# Patient Record
Sex: MALE | Race: WHITE | Employment: OTHER | URBAN - NONMETROPOLITAN AREA
[De-identification: names, ages, dates, MRNs, and addresses within clinical notes are randomized per-mention and may not be internally consistent; named-entity substitution may affect disease eponyms.]

---

## 2020-12-30 ENCOUNTER — HOSPITAL ENCOUNTER (EMERGENCY)
Age: 50
Discharge: HOME | End: 2020-12-30
Attending: EMERGENCY MEDICINE
Payer: MEDICAID

## 2020-12-30 VITALS
SYSTOLIC BLOOD PRESSURE: 156 MMHG | DIASTOLIC BLOOD PRESSURE: 94 MMHG | BODY MASS INDEX: 28.77 KG/M2 | RESPIRATION RATE: 18 BRPM | HEIGHT: 66 IN | OXYGEN SATURATION: 98 % | HEART RATE: 86 BPM | TEMPERATURE: 98.1 F | WEIGHT: 179 LBS

## 2020-12-30 PROCEDURE — 99282 EMERGENCY DEPT VISIT SF MDM: CPT

## 2020-12-30 RX ORDER — NAPROXEN 500 MG/1
500 TABLET ORAL 2 TIMES DAILY WITH MEALS
Qty: 30 TABLET | Refills: 1 | Status: ON HOLD | OUTPATIENT
Start: 2020-12-30 | End: 2021-11-11 | Stop reason: ALTCHOICE

## 2020-12-30 ASSESSMENT — PAIN DESCRIPTION - LOCATION: LOCATION: ELBOW

## 2020-12-30 ASSESSMENT — PAIN DESCRIPTION - ORIENTATION: ORIENTATION: RIGHT

## 2020-12-30 ASSESSMENT — PAIN SCALES - GENERAL: PAINLEVEL_OUTOF10: 10

## 2020-12-30 NOTE — ED PROVIDER NOTES
eMERGENCY dEPARTMENT eNCOUnter        [facility]    Chief Complaint   Patient presents with    Arm Pain     Pt has pain to right elbow for past couple days denies injury       HPI    José Luis Marrero is a 45 y.o. [facility] With complaint of R elbow pain. Onset x 1 week. Intensity of symptoms moderate, getting worse. Location of symptoms R elbow. Patient denies injury. Patient states that he has been shoveling a lot of snow in the past few days. REVIEW OF SYSTEMS    All systems reviewed and positives are in the HPI      PAST MEDICAL HISTORY    Past Medical History:   Diagnosis Date    Diabetes mellitus ([address]     Hypertension        SURGICAL HISTORY    Past Surgical History:   Procedure Laterality Date    HERNIA REPAIR         CURRENT MEDICATIONS    Current Outpatient Rx   Medication Sig Dispense Refill    METFORMIN HCL PO Take 1,000 mg by mouth 2 times daily      insulin aspart protamine-insulin aspart (NOVOLOG 70/30) (70-30) 100 UNIT/ML injection Inject 70 Units into the skin 2 times daily (with meals)      naproxen (NAPROSYN) 500 MG tablet Take 1 tablet by mouth 2 times daily (with meals) 30 tablet 1       ALLERGIES    No Known Allergies    FAMILY HISTORY    History reviewed. No pertinent family history.     SOCIAL HISTORY    Social History     Socioeconomic History    Marital status:      Spouse name: None    Number of children: None    Years of education: None    Highest education level: None   Occupational History    None   Social Needs    Financial resource strain: None    Food insecurity     Worry: None     Inability: None    Transportation needs     Medical: None     Non-medical: None   Tobacco Use    Smoking status: Never Smoker    Smokeless tobacco: Never Used   Substance and Sexual Activity    Alcohol use: None    Drug use: None    Sexual activity: None   Lifestyle    Physical activity     Days per week: None     Minutes per session: None    Stress: None   Relationships    Social connections     Talks on phone: None     Gets together: None     Attends Mandaeism service: None     Active member of club or organization: None     Attends meetings of clubs or organizations: None     Relationship status: None    Intimate partner violence     Fear of current or ex partner: None     Emotionally abused: None     Physically abused: None     Forced sexual activity: None   Other Topics Concern    None   Social History Narrative    None       PHYSICAL EXAM    VITAL SIGNS: BP (!) 156/94   Pulse 86   Temp 98.1 °F (36.7 °C) (Oral)   Resp 18   Ht 5' 6\" (1.676 m)   Wt 179 lb (81.2 kg)   SpO2 98%   BMI 28.89 kg/m²   Constitutional:  Well developed, well nourished, no acute distress, non-toxic appearance   HENT:  Atraumatic, external ears normal, nose normal, oropharynx moist.  Neck- normal range of motion, no tenderness, supple   Respiratory:  No respiratory distress, normal breath sounds. Cardiovascular:  Normal rate, normal rhythm, no murmurs, no gallops, no rubs   GI:  Soft, nondistended, normal bowel sounds, nontender   Musculoskeletal: Pain over the right lateral epicondyle with rotation to  resistance  Integument:  Well hydrated, no rash   Neurologic: Negative. RADIOLOGY/PROCEDURES    No orders to display         Labs  Labs Reviewed - No data to display          Summation      Patient Course: Ace wrap is applied to the right forearm. Physical activity neck sizes were discussed. Follow-up with Ortho if not better in a week.     ED Medications administered this visit:  Medications - No data to display    New Prescriptions from this visit:    New Prescriptions    NAPROXEN (NAPROSYN) 500 MG TABLET    Take 1 tablet by mouth 2 times daily (with meals)       Follow-up:  [address] [place] [address] [facility]  [phone]  Schedule an appointment as soon as possible for a visit in 1 week  As needed, If symptoms worsen        Final Impression:

## 2020-12-30 NOTE — ED TRIAGE NOTES
Pt medication list verified by patient, pt does take a blood pressure medication but he is unsure of the name.

## 2021-01-08 ENCOUNTER — HOSPITAL ENCOUNTER (OUTPATIENT)
Age: 51
Discharge: HOME | End: 2021-01-10
Payer: MEDICAID

## 2021-01-08 ENCOUNTER — HOSPITAL ENCOUNTER (OUTPATIENT)
Dept: GENERAL RADIOLOGY | Age: 51
Discharge: HOME | End: 2021-01-10
Payer: MEDICAID

## 2021-01-08 DIAGNOSIS — M25.521 RIGHT ELBOW PAIN: ICD-10-CM

## 2021-01-08 PROCEDURE — 73080 X-RAY EXAM OF ELBOW: CPT

## 2021-01-14 ENCOUNTER — HOSPITAL ENCOUNTER (EMERGENCY)
Age: 51
Discharge: HOME | End: 2021-01-14
Attending: FAMILY MEDICINE
Payer: MEDICAID

## 2021-01-14 VITALS
TEMPERATURE: 97.6 F | BODY MASS INDEX: 29.25 KG/M2 | RESPIRATION RATE: 18 BRPM | HEIGHT: 66 IN | DIASTOLIC BLOOD PRESSURE: 89 MMHG | SYSTOLIC BLOOD PRESSURE: 162 MMHG | HEART RATE: 91 BPM | OXYGEN SATURATION: 95 % | WEIGHT: 182 LBS

## 2021-01-14 DIAGNOSIS — Z86.39 HISTORY OF DIABETES MELLITUS: ICD-10-CM

## 2021-01-14 DIAGNOSIS — I10 ESSENTIAL HYPERTENSION: Primary | ICD-10-CM

## 2021-01-14 PROCEDURE — 99284 EMERGENCY DEPT VISIT MOD MDM: CPT

## 2021-01-14 PROCEDURE — 99283 EMERGENCY DEPT VISIT LOW MDM: CPT

## 2021-01-14 RX ORDER — INSULIN GLARGINE 100 [IU]/ML
50 INJECTION, SOLUTION SUBCUTANEOUS NIGHTLY
COMMUNITY

## 2021-01-14 RX ORDER — ATORVASTATIN CALCIUM 10 MG/1
10 TABLET, FILM COATED ORAL DAILY
COMMUNITY

## 2021-01-14 RX ORDER — LISINOPRIL 20 MG/1
20 TABLET ORAL DAILY
COMMUNITY
End: 2021-01-14

## 2021-01-14 RX ORDER — LISINOPRIL AND HYDROCHLOROTHIAZIDE 25; 20 MG/1; MG/1
1 TABLET ORAL DAILY
Qty: 30 TABLET | Refills: 1 | Status: ON HOLD | OUTPATIENT
Start: 2021-01-14 | End: 2021-11-11

## 2021-01-15 ASSESSMENT — ENCOUNTER SYMPTOMS
SHORTNESS OF BREATH: 0
PHOTOPHOBIA: 0
CHEST TIGHTNESS: 0
NAUSEA: 0

## 2021-01-15 NOTE — ED PROVIDER NOTES
[address]  eMERGENCY dEPARTMENT eNCOUnter          [facility]       Chief Complaint   Patient presents with    Hypertension     Pt ambulates in ER with c/o high blood pressure for past 2 weeks       Nurses Notes reviewed and I agree except as noted in the HPI. HISTORY OF PRESENT ILLNESS    Sia Pichardo is a 45 y.o. male who presents to the emergency room via private vehicle, patient states he has a history of blood pressure has been compliant with medications however his blood pressures been elevated, currently 176/100. Patient denies any chest pain or palpitations denies difficulty breathing shortness of breath denies any lightheaded dizziness, denies any photophobia, denies any tinnitus that is new, denies any nausea. Patient states he is currently separation with a new PCP but is not seeing them yet, so came to the emergency room for possible medication change. REVIEW OF SYSTEMS     Review of Systems   HENT: Negative for tinnitus. Eyes: Negative for photophobia and visual disturbance. Respiratory: Negative for chest tightness and shortness of breath. Cardiovascular: Negative for chest pain, palpitations and leg swelling. Gastrointestinal: Negative for nausea. Neurological: Negative for dizziness, light-headedness and headaches. All other systems reviewed and are negative. PAST MEDICAL HISTORY    has a past medical history of Diabetes mellitus ([address] and Hypertension. SURGICAL HISTORY      has a past surgical history that includes hernia repair.     CURRENT MEDICATIONS       Discharge Medication List as of 1/14/2021  9:37 AM      CONTINUE these medications which have NOT CHANGED    Details   atorvastatin (LIPITOR) 10 MG tablet Take 10 mg by mouth dailyHistorical Med      SITagliptin (JANUVIA) 100 MG tablet Take 100 mg by mouth dailyHistorical Med      insulin glargine (LANTUS) 100 UNIT/ML injection vial Inject 80 Units into the skin nightlyHistorical Med      naproxen (NAPROSYN) 500 MG tablet Take 1 tablet by mouth 2 times daily (with meals), Disp-30 tablet, R-1Print             ALLERGIES     has No Known Allergies. FAMILY HISTORY     has no family status information on file. family history is not on file. SOCIAL HISTORY      reports that he has never smoked. He has never used smokeless tobacco.    PHYSICAL EXAM     INITIAL VITALS:  height is 5' 6\" (1.676 m) and weight is 182 lb (82.6 kg). His oral temperature is 97.6 °F (36.4 °C). His blood pressure is 162/89 (abnormal) and his pulse is 91. His respiration is 18 and oxygen saturation is 95%. Physical Exam   Constitutional: Patient is oriented to person, place, and time. Patient appears well-developed and well-nourished. Patient is active and cooperative. HENT:   Head: Normocephalic and atraumatic. Head is without contusion. Right Ear: Hearing and external ear normal. No drainage. Left Ear: Hearing and external ear normal. No drainage. Nose: Nose normal. No nasal deformity. No epistaxis. Mouth/Throat: Mucous membranes are not dry. Eyes: EOMI. Conjunctivae, sclera, and lids are normal. Right eye exhibits no discharge. Left eye exhibits no discharge. Neck: Full passive range of motion without pain and phonation normal.   Cardiovascular:  Normal rate, regular rhythm and intact distal pulses. No edema. Pulses: Right radial pulse  2+   Pulmonary/Chest: Effort normal. No tachypnea and no bradypnea. No wheezes, rhonchi, or rales. Abdominal: Soft. Patient without distension or tenderness  Musculoskeletal:   Negative acute trauma or deformity,  apparent full range of motion and normal strength all extremities appropriate to age. Neurological: Patient is alert and oriented to person, place, and time. patient displays no tremor. Patient displays no seizure activity. . Normal observed gait  Skin: Skin is warm and dry. Patient is not diaphoretic.   Psychiatric: Patient has a tablet Take 1 tablet by mouth daily, Disp-30 tablet, R-1Normal                 Summation      Patient Course:  D/c    ED Medications administered this visit:  Medications - No data to display    New Prescriptions from this visit:    Discharge Medication List as of 1/14/2021  9:37 AM      START taking these medications    Details   lisinopril-hydroCHLOROthiazide (PRINZIDE;ZESTORETIC) 20-25 MG per tablet Take 1 tablet by mouth daily, Disp-30 tablet, R-1Normal             Follow-up:  Mervat Brown MD  [address] [place] [address]  [phone]    Call       [facility]  [address] [address]  [phone]    As needed, If symptoms worsen        Final Impression:   1. Essential hypertension    2.  History of diabetes mellitus               (Please note that portions of this note were completed with a voice recognition program.  Efforts were made to edit the dictations but occasionally words are mis-transcribed.)    MD Yesi Tse MD  01/15/21 6376

## 2021-11-10 ENCOUNTER — HOSPITAL ENCOUNTER (EMERGENCY)
Age: 51
Discharge: ANOTHER HOSPITAL | End: 2021-11-11
Attending: FAMILY MEDICINE
Payer: MEDICARE

## 2021-11-10 ENCOUNTER — APPOINTMENT (OUTPATIENT)
Dept: CT IMAGING | Age: 51
End: 2021-11-10
Payer: MEDICARE

## 2021-11-10 ENCOUNTER — APPOINTMENT (OUTPATIENT)
Dept: GENERAL RADIOLOGY | Age: 51
End: 2021-11-10
Payer: MEDICARE

## 2021-11-10 DIAGNOSIS — R11.2 NON-INTRACTABLE VOMITING WITH NAUSEA, UNSPECIFIED VOMITING TYPE: ICD-10-CM

## 2021-11-10 DIAGNOSIS — N17.9 ACUTE KIDNEY INJURY (NONTRAUMATIC) (HCC): ICD-10-CM

## 2021-11-10 DIAGNOSIS — K56.7 ILEUS (HCC): ICD-10-CM

## 2021-11-10 DIAGNOSIS — N20.0 KIDNEY STONE ON LEFT SIDE: Primary | ICD-10-CM

## 2021-11-10 LAB
ABO/RH: NORMAL
ABSOLUTE EOS #: 0.2 K/UL (ref 0–0.4)
ABSOLUTE IMMATURE GRANULOCYTE: NORMAL K/UL (ref 0–0.3)
ABSOLUTE LYMPH #: 2.1 K/UL (ref 1–4.8)
ABSOLUTE MONO #: 0.6 K/UL (ref 0–1)
ALBUMIN SERPL-MCNC: 4.2 G/DL (ref 3.5–5.2)
ALBUMIN/GLOBULIN RATIO: NORMAL (ref 1–2.5)
ALP BLD-CCNC: 53 U/L (ref 40–129)
ALT SERPL-CCNC: 35 U/L (ref 5–41)
ANION GAP SERPL CALCULATED.3IONS-SCNC: 21 MMOL/L (ref 9–17)
ANTIBODY SCREEN: NEGATIVE
ARM BAND NUMBER: NORMAL
AST SERPL-CCNC: 23 U/L
BASOPHILS # BLD: 1 % (ref 0–2)
BASOPHILS ABSOLUTE: 0.1 K/UL (ref 0–0.2)
BILIRUB SERPL-MCNC: 0.44 MG/DL (ref 0.3–1.2)
BILIRUBIN DIRECT: <0.08 MG/DL
BILIRUBIN, INDIRECT: NORMAL MG/DL (ref 0–1)
BNP INTERPRETATION: NORMAL
BUN BLDV-MCNC: 27 MG/DL (ref 6–20)
BUN/CREAT BLD: 17 (ref 9–20)
CALCIUM SERPL-MCNC: 10.8 MG/DL (ref 8.6–10.4)
CHLORIDE BLD-SCNC: 95 MMOL/L (ref 98–107)
CO2: 19 MMOL/L (ref 20–31)
CREAT SERPL-MCNC: 1.57 MG/DL (ref 0.7–1.2)
DIFFERENTIAL TYPE: YES
EOSINOPHILS RELATIVE PERCENT: 2 % (ref 0–5)
EXPIRATION DATE: NORMAL
GFR AFRICAN AMERICAN: 57 ML/MIN
GFR NON-AFRICAN AMERICAN: 47 ML/MIN
GFR SERPL CREATININE-BSD FRML MDRD: ABNORMAL ML/MIN/{1.73_M2}
GFR SERPL CREATININE-BSD FRML MDRD: ABNORMAL ML/MIN/{1.73_M2}
GLOBULIN: NORMAL G/DL (ref 1.5–3.8)
GLUCOSE BLD-MCNC: 133 MG/DL (ref 65–99)
GLUCOSE BLD-MCNC: 137 MG/DL (ref 70–99)
HCT VFR BLD CALC: 47.8 % (ref 41–53)
HEMOGLOBIN: 16.3 G/DL (ref 13.5–17.5)
IMMATURE GRANULOCYTES: NORMAL %
INR BLD: 1
LACTIC ACID: 2.8 MMOL/L (ref 0.5–2.2)
LIPASE: 69 U/L (ref 13–60)
LYMPHOCYTES # BLD: 23 % (ref 13–44)
MAGNESIUM: 2.5 MG/DL (ref 1.6–2.6)
MCH RBC QN AUTO: 30 PG (ref 26–34)
MCHC RBC AUTO-ENTMCNC: 34.2 G/DL (ref 31–37)
MCV RBC AUTO: 87.8 FL (ref 80–100)
MONOCYTES # BLD: 6 % (ref 5–9)
NRBC AUTOMATED: NORMAL PER 100 WBC
PDW BLD-RTO: 13.4 % (ref 12.1–15.2)
PLATELET # BLD: 274 K/UL (ref 140–450)
PLATELET ESTIMATE: NORMAL
PMV BLD AUTO: NORMAL FL (ref 6–12)
POTASSIUM SERPL-SCNC: 3.3 MMOL/L (ref 3.7–5.3)
PRO-BNP: 73 PG/ML
PROTHROMBIN TIME: 13.2 SEC (ref 11.5–14.2)
RBC # BLD: 5.45 M/UL (ref 4.5–5.9)
RBC # BLD: NORMAL 10*6/UL
SARS-COV-2, RAPID: NOT DETECTED
SEG NEUTROPHILS: 68 % (ref 39–75)
SEGMENTED NEUTROPHILS ABSOLUTE COUNT: 6.5 K/UL (ref 2.1–6.5)
SODIUM BLD-SCNC: 135 MMOL/L (ref 135–144)
SPECIMEN DESCRIPTION: NORMAL
TOTAL PROTEIN: 7.3 G/DL (ref 6.4–8.3)
TROPONIN INTERP: NORMAL
TROPONIN INTERP: NORMAL
TROPONIN T: NORMAL NG/ML
TROPONIN T: NORMAL NG/ML
TROPONIN, HIGH SENSITIVITY: 16 NG/L (ref 0–22)
TROPONIN, HIGH SENSITIVITY: 20 NG/L (ref 0–22)
WBC # BLD: 9.4 K/UL (ref 3.5–11)
WBC # BLD: NORMAL 10*3/UL

## 2021-11-10 PROCEDURE — 96375 TX/PRO/DX INJ NEW DRUG ADDON: CPT

## 2021-11-10 PROCEDURE — 96376 TX/PRO/DX INJ SAME DRUG ADON: CPT

## 2021-11-10 PROCEDURE — 83735 ASSAY OF MAGNESIUM: CPT

## 2021-11-10 PROCEDURE — 86900 BLOOD TYPING SEROLOGIC ABO: CPT

## 2021-11-10 PROCEDURE — 85025 COMPLETE CBC W/AUTO DIFF WBC: CPT

## 2021-11-10 PROCEDURE — 86901 BLOOD TYPING SEROLOGIC RH(D): CPT

## 2021-11-10 PROCEDURE — 85610 PROTHROMBIN TIME: CPT

## 2021-11-10 PROCEDURE — 83880 ASSAY OF NATRIURETIC PEPTIDE: CPT

## 2021-11-10 PROCEDURE — 96374 THER/PROPH/DIAG INJ IV PUSH: CPT

## 2021-11-10 PROCEDURE — 80076 HEPATIC FUNCTION PANEL: CPT

## 2021-11-10 PROCEDURE — 80048 BASIC METABOLIC PNL TOTAL CA: CPT

## 2021-11-10 PROCEDURE — 71275 CT ANGIOGRAPHY CHEST: CPT

## 2021-11-10 PROCEDURE — 36415 COLL VENOUS BLD VENIPUNCTURE: CPT

## 2021-11-10 PROCEDURE — 6360000004 HC RX CONTRAST MEDICATION: Performed by: FAMILY MEDICINE

## 2021-11-10 PROCEDURE — 86850 RBC ANTIBODY SCREEN: CPT

## 2021-11-10 PROCEDURE — 84484 ASSAY OF TROPONIN QUANT: CPT

## 2021-11-10 PROCEDURE — 2580000003 HC RX 258: Performed by: FAMILY MEDICINE

## 2021-11-10 PROCEDURE — 99285 EMERGENCY DEPT VISIT HI MDM: CPT

## 2021-11-10 PROCEDURE — 82947 ASSAY GLUCOSE BLOOD QUANT: CPT

## 2021-11-10 PROCEDURE — 6360000002 HC RX W HCPCS: Performed by: FAMILY MEDICINE

## 2021-11-10 PROCEDURE — 81001 URINALYSIS AUTO W/SCOPE: CPT

## 2021-11-10 PROCEDURE — 87635 SARS-COV-2 COVID-19 AMP PRB: CPT

## 2021-11-10 PROCEDURE — 71045 X-RAY EXAM CHEST 1 VIEW: CPT

## 2021-11-10 PROCEDURE — 93005 ELECTROCARDIOGRAM TRACING: CPT | Performed by: FAMILY MEDICINE

## 2021-11-10 PROCEDURE — 83605 ASSAY OF LACTIC ACID: CPT

## 2021-11-10 PROCEDURE — 83690 ASSAY OF LIPASE: CPT

## 2021-11-10 RX ORDER — METHYLPREDNISOLONE SODIUM SUCCINATE 125 MG/2ML
80 INJECTION, POWDER, LYOPHILIZED, FOR SOLUTION INTRAMUSCULAR; INTRAVENOUS ONCE
Status: DISCONTINUED | OUTPATIENT
Start: 2021-11-10 | End: 2021-11-10

## 2021-11-10 RX ORDER — KETOROLAC TROMETHAMINE 30 MG/ML
15 INJECTION, SOLUTION INTRAMUSCULAR; INTRAVENOUS ONCE
Status: DISCONTINUED | OUTPATIENT
Start: 2021-11-10 | End: 2021-11-10

## 2021-11-10 RX ORDER — MELOXICAM 7.5 MG/1
15 TABLET ORAL DAILY
Status: ON HOLD | COMMUNITY
End: 2021-11-12

## 2021-11-10 RX ORDER — FENTANYL CITRATE 50 UG/ML
50 INJECTION, SOLUTION INTRAMUSCULAR; INTRAVENOUS ONCE
Status: COMPLETED | OUTPATIENT
Start: 2021-11-10 | End: 2021-11-10

## 2021-11-10 RX ORDER — FENTANYL CITRATE 50 UG/ML
25 INJECTION, SOLUTION INTRAMUSCULAR; INTRAVENOUS ONCE
Status: COMPLETED | OUTPATIENT
Start: 2021-11-10 | End: 2021-11-10

## 2021-11-10 RX ORDER — SODIUM CHLORIDE 9 MG/ML
1000 INJECTION, SOLUTION INTRAVENOUS CONTINUOUS
Status: DISCONTINUED | OUTPATIENT
Start: 2021-11-10 | End: 2021-11-11 | Stop reason: HOSPADM

## 2021-11-10 RX ORDER — PROMETHAZINE HYDROCHLORIDE 25 MG/ML
12.5 INJECTION, SOLUTION INTRAMUSCULAR; INTRAVENOUS ONCE
Status: COMPLETED | OUTPATIENT
Start: 2021-11-10 | End: 2021-11-10

## 2021-11-10 RX ORDER — 0.9 % SODIUM CHLORIDE 0.9 %
1000 INTRAVENOUS SOLUTION INTRAVENOUS ONCE
Status: COMPLETED | OUTPATIENT
Start: 2021-11-10 | End: 2021-11-10

## 2021-11-10 RX ORDER — ONDANSETRON 2 MG/ML
4 INJECTION INTRAMUSCULAR; INTRAVENOUS ONCE
Status: COMPLETED | OUTPATIENT
Start: 2021-11-10 | End: 2021-11-10

## 2021-11-10 RX ORDER — DIPHENHYDRAMINE HYDROCHLORIDE 50 MG/ML
25 INJECTION INTRAMUSCULAR; INTRAVENOUS ONCE
Status: DISCONTINUED | OUTPATIENT
Start: 2021-11-10 | End: 2021-11-10

## 2021-11-10 RX ADMIN — SODIUM CHLORIDE 1000 ML: 9 INJECTION, SOLUTION INTRAVENOUS at 19:42

## 2021-11-10 RX ADMIN — SODIUM CHLORIDE 1000 ML: 9 INJECTION, SOLUTION INTRAVENOUS at 17:52

## 2021-11-10 RX ADMIN — FENTANYL CITRATE 50 MCG: 50 INJECTION, SOLUTION INTRAMUSCULAR; INTRAVENOUS at 19:42

## 2021-11-10 RX ADMIN — IOPAMIDOL 100 ML: 755 INJECTION, SOLUTION INTRAVENOUS at 17:42

## 2021-11-10 RX ADMIN — FENTANYL CITRATE 25 MCG: 50 INJECTION, SOLUTION INTRAMUSCULAR; INTRAVENOUS at 19:01

## 2021-11-10 RX ADMIN — FENTANYL CITRATE 25 MCG: 50 INJECTION, SOLUTION INTRAMUSCULAR; INTRAVENOUS at 18:28

## 2021-11-10 RX ADMIN — ONDANSETRON 4 MG: 2 INJECTION INTRAMUSCULAR; INTRAVENOUS at 17:30

## 2021-11-10 RX ADMIN — PROMETHAZINE HYDROCHLORIDE 12.5 MG: 25 INJECTION INTRAMUSCULAR; INTRAVENOUS at 19:00

## 2021-11-10 ASSESSMENT — PAIN DESCRIPTION - PAIN TYPE: TYPE: ACUTE PAIN

## 2021-11-10 ASSESSMENT — PAIN SCALES - GENERAL
PAINLEVEL_OUTOF10: 9
PAINLEVEL_OUTOF10: 10
PAINLEVEL_OUTOF10: 10
PAINLEVEL_OUTOF10: 9

## 2021-11-10 ASSESSMENT — PAIN DESCRIPTION - FREQUENCY: FREQUENCY: CONTINUOUS

## 2021-11-10 ASSESSMENT — PAIN DESCRIPTION - DESCRIPTORS: DESCRIPTORS: SHARP

## 2021-11-10 ASSESSMENT — PAIN DESCRIPTION - LOCATION: LOCATION: ABDOMEN

## 2021-11-10 NOTE — ED TRIAGE NOTES
SEVERE ABDOMINAL PAIN STARTED 3 DAYS AGO IN THE LOW ABD AND TRAVELS TO EPIGASTRIC AREA. SOB, VOMITING AS WEL. Thuan Payan

## 2021-11-11 ENCOUNTER — DIRECT ADMIT ORDERS (OUTPATIENT)
Dept: INTERNAL MEDICINE CLINIC | Age: 51
End: 2021-11-11

## 2021-11-11 ENCOUNTER — HOSPITAL ENCOUNTER (INPATIENT)
Age: 51
LOS: 2 days | Discharge: HOME | DRG: 389 | End: 2021-11-13
Attending: INTERNAL MEDICINE | Admitting: INTERNAL MEDICINE
Payer: MEDICARE

## 2021-11-11 VITALS
DIASTOLIC BLOOD PRESSURE: 88 MMHG | RESPIRATION RATE: 14 BRPM | HEART RATE: 103 BPM | OXYGEN SATURATION: 93 % | SYSTOLIC BLOOD PRESSURE: 130 MMHG | WEIGHT: 188 LBS | BODY MASS INDEX: 30.34 KG/M2

## 2021-11-11 PROBLEM — I10 ESSENTIAL HYPERTENSION: Status: ACTIVE | Noted: 2021-11-11

## 2021-11-11 PROBLEM — Z87.19 HISTORY OF HERNIA SURGERY: Status: ACTIVE | Noted: 2021-11-11

## 2021-11-11 PROBLEM — N17.9 AKI (ACUTE KIDNEY INJURY) (HCC): Status: ACTIVE | Noted: 2021-11-11

## 2021-11-11 PROBLEM — K56.600 PARTIAL SMALL BOWEL OBSTRUCTION (HCC): Status: ACTIVE | Noted: 2021-11-11

## 2021-11-11 PROBLEM — E11.69 DIABETES MELLITUS TYPE 2 IN OBESE (HCC): Status: ACTIVE | Noted: 2021-11-11

## 2021-11-11 PROBLEM — R10.10 PAIN OF UPPER ABDOMEN: Status: ACTIVE | Noted: 2021-11-11

## 2021-11-11 PROBLEM — N20.1 LEFT URETERAL STONE: Status: ACTIVE | Noted: 2021-11-11

## 2021-11-11 PROBLEM — E66.9 DIABETES MELLITUS TYPE 2 IN OBESE (HCC): Status: ACTIVE | Noted: 2021-11-11

## 2021-11-11 PROBLEM — Z98.890 HISTORY OF HERNIA SURGERY: Status: ACTIVE | Noted: 2021-11-11

## 2021-11-11 PROBLEM — N13.2 HYDRONEPHROSIS WITH RENAL CALCULOUS OBSTRUCTION: Status: ACTIVE | Noted: 2021-11-11

## 2021-11-11 LAB
-: NORMAL
AMORPHOUS: NORMAL
BACTERIA: NORMAL
BILIRUBIN URINE: ABNORMAL
CASTS UA: NORMAL /LPF
COLOR: YELLOW
COMMENT UA: ABNORMAL
CRYSTALS, UA: NORMAL /HPF
EKG ATRIAL RATE: 96 BPM
EKG P AXIS: 57 DEGREES
EKG P-R INTERVAL: 136 MS
EKG Q-T INTERVAL: 376 MS
EKG QRS DURATION: 92 MS
EKG QTC CALCULATION (BAZETT): 475 MS
EKG R AXIS: 17 DEGREES
EKG T AXIS: 56 DEGREES
EKG VENTRICULAR RATE: 96 BPM
EPITHELIAL CELLS UA: NORMAL /HPF
ESTIMATED AVERAGE GLUCOSE: 197 MG/DL
GLUCOSE BLD-MCNC: 143 MG/DL (ref 75–110)
GLUCOSE BLD-MCNC: 166 MG/DL (ref 75–110)
GLUCOSE URINE: ABNORMAL
HBA1C MFR BLD: 8.5 % (ref 4–6)
KETONES, URINE: ABNORMAL
LACTIC ACID, SEPSIS WHOLE BLOOD: 1 MMOL/L (ref 0.5–1.9)
LACTIC ACID, SEPSIS WHOLE BLOOD: 1.1 MMOL/L (ref 0.5–1.9)
LACTIC ACID, SEPSIS: NORMAL MMOL/L (ref 0.5–1.9)
LACTIC ACID, SEPSIS: NORMAL MMOL/L (ref 0.5–1.9)
LEUKOCYTE ESTERASE, URINE: NEGATIVE
MUCUS: NORMAL
NITRITE, URINE: NEGATIVE
OTHER OBSERVATIONS UA: NORMAL
PH UA: 5 (ref 5–8)
PROTEIN UA: ABNORMAL
RBC UA: NORMAL /HPF (ref 0–2)
RENAL EPITHELIAL, UA: NORMAL /HPF
SPECIFIC GRAVITY UA: 1.01 (ref 1–1.03)
TRICHOMONAS: NORMAL
TURBIDITY: CLEAR
URINE HGB: NEGATIVE
UROBILINOGEN, URINE: NORMAL
WBC UA: NORMAL /HPF
YEAST: NORMAL

## 2021-11-11 PROCEDURE — 2580000003 HC RX 258: Performed by: EMERGENCY MEDICINE

## 2021-11-11 PROCEDURE — 6360000002 HC RX W HCPCS: Performed by: FAMILY MEDICINE

## 2021-11-11 PROCEDURE — 51798 US URINE CAPACITY MEASURE: CPT

## 2021-11-11 PROCEDURE — 83605 ASSAY OF LACTIC ACID: CPT

## 2021-11-11 PROCEDURE — 83036 HEMOGLOBIN GLYCOSYLATED A1C: CPT

## 2021-11-11 PROCEDURE — 6360000002 HC RX W HCPCS: Performed by: INTERNAL MEDICINE

## 2021-11-11 PROCEDURE — 2580000003 HC RX 258: Performed by: FAMILY MEDICINE

## 2021-11-11 PROCEDURE — 2580000003 HC RX 258: Performed by: NURSE PRACTITIONER

## 2021-11-11 PROCEDURE — 1200000000 HC SEMI PRIVATE

## 2021-11-11 PROCEDURE — C9113 INJ PANTOPRAZOLE SODIUM, VIA: HCPCS | Performed by: EMERGENCY MEDICINE

## 2021-11-11 PROCEDURE — 99223 1ST HOSP IP/OBS HIGH 75: CPT | Performed by: INTERNAL MEDICINE

## 2021-11-11 PROCEDURE — 82947 ASSAY GLUCOSE BLOOD QUANT: CPT

## 2021-11-11 PROCEDURE — 6370000000 HC RX 637 (ALT 250 FOR IP): Performed by: NURSE PRACTITIONER

## 2021-11-11 PROCEDURE — 6360000002 HC RX W HCPCS: Performed by: NURSE PRACTITIONER

## 2021-11-11 PROCEDURE — 93010 ELECTROCARDIOGRAM REPORT: CPT | Performed by: INTERNAL MEDICINE

## 2021-11-11 PROCEDURE — 6360000002 HC RX W HCPCS: Performed by: EMERGENCY MEDICINE

## 2021-11-11 PROCEDURE — 36415 COLL VENOUS BLD VENIPUNCTURE: CPT

## 2021-11-11 PROCEDURE — 6370000000 HC RX 637 (ALT 250 FOR IP): Performed by: INTERNAL MEDICINE

## 2021-11-11 RX ORDER — ONDANSETRON 4 MG/1
4 TABLET, ORALLY DISINTEGRATING ORAL EVERY 8 HOURS PRN
Status: CANCELLED | OUTPATIENT
Start: 2021-11-11

## 2021-11-11 RX ORDER — SODIUM CHLORIDE 9 MG/ML
25 INJECTION, SOLUTION INTRAVENOUS PRN
Status: DISCONTINUED | OUTPATIENT
Start: 2021-11-11 | End: 2021-11-13 | Stop reason: HOSPADM

## 2021-11-11 RX ORDER — PANTOPRAZOLE SODIUM 40 MG/10ML
40 INJECTION, POWDER, LYOPHILIZED, FOR SOLUTION INTRAVENOUS ONCE
Status: COMPLETED | OUTPATIENT
Start: 2021-11-11 | End: 2021-11-11

## 2021-11-11 RX ORDER — DEXTROSE MONOHYDRATE 50 MG/ML
100 INJECTION, SOLUTION INTRAVENOUS PRN
Status: CANCELLED | OUTPATIENT
Start: 2021-11-11

## 2021-11-11 RX ORDER — HYDRALAZINE HYDROCHLORIDE 20 MG/ML
10 INJECTION INTRAMUSCULAR; INTRAVENOUS EVERY 4 HOURS PRN
Status: DISCONTINUED | OUTPATIENT
Start: 2021-11-11 | End: 2021-11-13 | Stop reason: HOSPADM

## 2021-11-11 RX ORDER — MORPHINE SULFATE 2 MG/ML
2 INJECTION, SOLUTION INTRAMUSCULAR; INTRAVENOUS
Status: CANCELLED | OUTPATIENT
Start: 2021-11-11

## 2021-11-11 RX ORDER — ONDANSETRON 2 MG/ML
4 INJECTION INTRAMUSCULAR; INTRAVENOUS EVERY 6 HOURS PRN
Status: CANCELLED | OUTPATIENT
Start: 2021-11-11

## 2021-11-11 RX ORDER — HYDROCODONE BITARTRATE AND ACETAMINOPHEN 5; 325 MG/1; MG/1
2 TABLET ORAL EVERY 4 HOURS PRN
Status: CANCELLED | OUTPATIENT
Start: 2021-11-11

## 2021-11-11 RX ORDER — SODIUM CHLORIDE 9 MG/ML
INJECTION, SOLUTION INTRAVENOUS CONTINUOUS
Status: DISCONTINUED | OUTPATIENT
Start: 2021-11-11 | End: 2021-11-13

## 2021-11-11 RX ORDER — SODIUM CHLORIDE 9 MG/ML
25 INJECTION, SOLUTION INTRAVENOUS PRN
Status: CANCELLED | OUTPATIENT
Start: 2021-11-11

## 2021-11-11 RX ORDER — TAMSULOSIN HYDROCHLORIDE 0.4 MG/1
0.4 CAPSULE ORAL DAILY
Status: DISCONTINUED | OUTPATIENT
Start: 2021-11-11 | End: 2021-11-13 | Stop reason: HOSPADM

## 2021-11-11 RX ORDER — NICOTINE POLACRILEX 4 MG
15 LOZENGE BUCCAL PRN
Status: CANCELLED | OUTPATIENT
Start: 2021-11-11

## 2021-11-11 RX ORDER — DEXTROSE MONOHYDRATE 25 G/50ML
12.5 INJECTION, SOLUTION INTRAVENOUS PRN
Status: CANCELLED | OUTPATIENT
Start: 2021-11-11

## 2021-11-11 RX ORDER — MORPHINE SULFATE 4 MG/ML
4 INJECTION, SOLUTION INTRAMUSCULAR; INTRAVENOUS
Status: CANCELLED | OUTPATIENT
Start: 2021-11-11

## 2021-11-11 RX ORDER — NICOTINE POLACRILEX 4 MG
15 LOZENGE BUCCAL PRN
Status: DISCONTINUED | OUTPATIENT
Start: 2021-11-11 | End: 2021-11-13 | Stop reason: HOSPADM

## 2021-11-11 RX ORDER — POLYETHYLENE GLYCOL 3350 17 G/17G
17 POWDER, FOR SOLUTION ORAL DAILY PRN
Status: DISCONTINUED | OUTPATIENT
Start: 2021-11-11 | End: 2021-11-13 | Stop reason: HOSPADM

## 2021-11-11 RX ORDER — HYDROCODONE BITARTRATE AND ACETAMINOPHEN 5; 325 MG/1; MG/1
1 TABLET ORAL EVERY 4 HOURS PRN
Status: CANCELLED | OUTPATIENT
Start: 2021-11-11

## 2021-11-11 RX ORDER — ACETAMINOPHEN 325 MG/1
650 TABLET ORAL EVERY 4 HOURS PRN
Status: DISCONTINUED | OUTPATIENT
Start: 2021-11-11 | End: 2021-11-13 | Stop reason: HOSPADM

## 2021-11-11 RX ORDER — SODIUM CHLORIDE 0.9 % (FLUSH) 0.9 %
5-40 SYRINGE (ML) INJECTION EVERY 12 HOURS SCHEDULED
Status: CANCELLED | OUTPATIENT
Start: 2021-11-11

## 2021-11-11 RX ORDER — DEXTROSE MONOHYDRATE 25 G/50ML
12.5 INJECTION, SOLUTION INTRAVENOUS PRN
Status: DISCONTINUED | OUTPATIENT
Start: 2021-11-11 | End: 2021-11-13 | Stop reason: HOSPADM

## 2021-11-11 RX ORDER — HYDROCODONE BITARTRATE AND ACETAMINOPHEN 5; 325 MG/1; MG/1
1 TABLET ORAL EVERY 4 HOURS PRN
Status: DISCONTINUED | OUTPATIENT
Start: 2021-11-11 | End: 2021-11-13 | Stop reason: HOSPADM

## 2021-11-11 RX ORDER — MORPHINE SULFATE 2 MG/ML
0.5 INJECTION, SOLUTION INTRAMUSCULAR; INTRAVENOUS EVERY 4 HOURS PRN
Status: DISCONTINUED | OUTPATIENT
Start: 2021-11-11 | End: 2021-11-13 | Stop reason: HOSPADM

## 2021-11-11 RX ORDER — CIPROFLOXACIN 2 MG/ML
400 INJECTION, SOLUTION INTRAVENOUS EVERY 12 HOURS
Status: DISCONTINUED | OUTPATIENT
Start: 2021-11-11 | End: 2021-11-13

## 2021-11-11 RX ORDER — LOSARTAN POTASSIUM 100 MG/1
100 TABLET ORAL DAILY
Status: ON HOLD | COMMUNITY
End: 2021-11-12

## 2021-11-11 RX ORDER — SODIUM CHLORIDE 0.9 % (FLUSH) 0.9 %
5-40 SYRINGE (ML) INJECTION PRN
Status: CANCELLED | OUTPATIENT
Start: 2021-11-11

## 2021-11-11 RX ORDER — POLYETHYLENE GLYCOL 3350 17 G/17G
17 POWDER, FOR SOLUTION ORAL DAILY PRN
Status: CANCELLED | OUTPATIENT
Start: 2021-11-11

## 2021-11-11 RX ORDER — ONDANSETRON 4 MG/1
4 TABLET, ORALLY DISINTEGRATING ORAL EVERY 8 HOURS PRN
Status: DISCONTINUED | OUTPATIENT
Start: 2021-11-11 | End: 2021-11-13 | Stop reason: HOSPADM

## 2021-11-11 RX ORDER — HYDROCODONE BITARTRATE AND ACETAMINOPHEN 5; 325 MG/1; MG/1
2 TABLET ORAL EVERY 4 HOURS PRN
Status: DISCONTINUED | OUTPATIENT
Start: 2021-11-11 | End: 2021-11-13 | Stop reason: HOSPADM

## 2021-11-11 RX ORDER — HEPARIN SODIUM 5000 [USP'U]/ML
5000 INJECTION, SOLUTION INTRAVENOUS; SUBCUTANEOUS EVERY 8 HOURS SCHEDULED
Status: DISCONTINUED | OUTPATIENT
Start: 2021-11-11 | End: 2021-11-13 | Stop reason: HOSPADM

## 2021-11-11 RX ORDER — TAMSULOSIN HYDROCHLORIDE 0.4 MG/1
0.4 CAPSULE ORAL DAILY
Status: CANCELLED | OUTPATIENT
Start: 2021-11-11

## 2021-11-11 RX ORDER — BISACODYL 10 MG
10 SUPPOSITORY, RECTAL RECTAL DAILY PRN
Status: DISCONTINUED | OUTPATIENT
Start: 2021-11-11 | End: 2021-11-13 | Stop reason: HOSPADM

## 2021-11-11 RX ORDER — DEXTROSE MONOHYDRATE 50 MG/ML
100 INJECTION, SOLUTION INTRAVENOUS PRN
Status: DISCONTINUED | OUTPATIENT
Start: 2021-11-11 | End: 2021-11-13 | Stop reason: HOSPADM

## 2021-11-11 RX ORDER — SODIUM CHLORIDE 0.9 % (FLUSH) 0.9 %
5-40 SYRINGE (ML) INJECTION EVERY 12 HOURS SCHEDULED
Status: DISCONTINUED | OUTPATIENT
Start: 2021-11-11 | End: 2021-11-13 | Stop reason: HOSPADM

## 2021-11-11 RX ORDER — SODIUM CHLORIDE 9 MG/ML
INJECTION, SOLUTION INTRAVENOUS CONTINUOUS
Status: CANCELLED | OUTPATIENT
Start: 2021-11-11

## 2021-11-11 RX ORDER — MORPHINE SULFATE 2 MG/ML
2 INJECTION, SOLUTION INTRAMUSCULAR; INTRAVENOUS
Status: DISCONTINUED | OUTPATIENT
Start: 2021-11-11 | End: 2021-11-11

## 2021-11-11 RX ORDER — MORPHINE SULFATE 4 MG/ML
4 INJECTION, SOLUTION INTRAMUSCULAR; INTRAVENOUS
Status: DISCONTINUED | OUTPATIENT
Start: 2021-11-11 | End: 2021-11-11

## 2021-11-11 RX ORDER — CIPROFLOXACIN 2 MG/ML
400 INJECTION, SOLUTION INTRAVENOUS EVERY 12 HOURS
Status: CANCELLED | OUTPATIENT
Start: 2021-11-11

## 2021-11-11 RX ORDER — MORPHINE SULFATE 2 MG/ML
1 INJECTION, SOLUTION INTRAMUSCULAR; INTRAVENOUS EVERY 4 HOURS PRN
Status: DISCONTINUED | OUTPATIENT
Start: 2021-11-11 | End: 2021-11-13 | Stop reason: HOSPADM

## 2021-11-11 RX ORDER — SODIUM CHLORIDE 0.9 % (FLUSH) 0.9 %
5-40 SYRINGE (ML) INJECTION PRN
Status: DISCONTINUED | OUTPATIENT
Start: 2021-11-11 | End: 2021-11-13 | Stop reason: HOSPADM

## 2021-11-11 RX ORDER — FENTANYL CITRATE 50 UG/ML
25 INJECTION, SOLUTION INTRAMUSCULAR; INTRAVENOUS ONCE
Status: COMPLETED | OUTPATIENT
Start: 2021-11-11 | End: 2021-11-11

## 2021-11-11 RX ORDER — ONDANSETRON 2 MG/ML
4 INJECTION INTRAMUSCULAR; INTRAVENOUS EVERY 6 HOURS PRN
Status: DISCONTINUED | OUTPATIENT
Start: 2021-11-11 | End: 2021-11-13 | Stop reason: HOSPADM

## 2021-11-11 RX ORDER — SODIUM CHLORIDE 9 MG/ML
10 INJECTION INTRAVENOUS ONCE
Status: COMPLETED | OUTPATIENT
Start: 2021-11-11 | End: 2021-11-11

## 2021-11-11 RX ORDER — GLUCAGON 1 MG/ML
1 KIT INJECTION PRN
Status: CANCELLED | OUTPATIENT
Start: 2021-11-11

## 2021-11-11 RX ORDER — ACETAMINOPHEN 325 MG/1
650 TABLET ORAL EVERY 4 HOURS PRN
Status: CANCELLED | OUTPATIENT
Start: 2021-11-11

## 2021-11-11 RX ADMIN — SODIUM CHLORIDE, PRESERVATIVE FREE 10 ML: 5 INJECTION INTRAVENOUS at 12:23

## 2021-11-11 RX ADMIN — MORPHINE SULFATE 1 MG: 2 INJECTION, SOLUTION INTRAMUSCULAR; INTRAVENOUS at 20:16

## 2021-11-11 RX ADMIN — TAMSULOSIN HYDROCHLORIDE 0.4 MG: 0.4 CAPSULE ORAL at 18:53

## 2021-11-11 RX ADMIN — MORPHINE SULFATE 4 MG: 4 INJECTION, SOLUTION INTRAMUSCULAR; INTRAVENOUS at 16:32

## 2021-11-11 RX ADMIN — PANTOPRAZOLE SODIUM 40 MG: 40 INJECTION, POWDER, FOR SOLUTION INTRAVENOUS at 12:23

## 2021-11-11 RX ADMIN — INSULIN LISPRO 1 UNITS: 100 INJECTION, SOLUTION INTRAVENOUS; SUBCUTANEOUS at 21:58

## 2021-11-11 RX ADMIN — CIPROFLOXACIN 400 MG: 2 INJECTION, SOLUTION INTRAVENOUS at 18:53

## 2021-11-11 RX ADMIN — SODIUM CHLORIDE: 9 INJECTION, SOLUTION INTRAVENOUS at 18:53

## 2021-11-11 RX ADMIN — SODIUM CHLORIDE 1000 ML: 9 INJECTION, SOLUTION INTRAVENOUS at 11:40

## 2021-11-11 RX ADMIN — FENTANYL CITRATE 25 MCG: 50 INJECTION, SOLUTION INTRAMUSCULAR; INTRAVENOUS at 07:23

## 2021-11-11 ASSESSMENT — PAIN DESCRIPTION - FREQUENCY
FREQUENCY: CONTINUOUS
FREQUENCY: CONTINUOUS

## 2021-11-11 ASSESSMENT — PAIN DESCRIPTION - ORIENTATION: ORIENTATION: MID

## 2021-11-11 ASSESSMENT — PAIN DESCRIPTION - DESCRIPTORS
DESCRIPTORS: ACHING
DESCRIPTORS: ACHING

## 2021-11-11 ASSESSMENT — PAIN SCALES - GENERAL
PAINLEVEL_OUTOF10: 7
PAINLEVEL_OUTOF10: 10
PAINLEVEL_OUTOF10: 8
PAINLEVEL_OUTOF10: 10
PAINLEVEL_OUTOF10: 10
PAINLEVEL_OUTOF10: 8

## 2021-11-11 ASSESSMENT — PAIN DESCRIPTION - PAIN TYPE
TYPE: ACUTE PAIN
TYPE: ACUTE PAIN

## 2021-11-11 ASSESSMENT — ENCOUNTER SYMPTOMS: ABDOMINAL PAIN: 1

## 2021-11-11 ASSESSMENT — PAIN DESCRIPTION - ONSET
ONSET: ON-GOING
ONSET: ON-GOING

## 2021-11-11 ASSESSMENT — PAIN DESCRIPTION - LOCATION
LOCATION: CHEST
LOCATION: CHEST

## 2021-11-11 NOTE — CARE COORDINATION
Case Management Initial Discharge Plan  Mariella Wolff,         Met with:patient to discuss discharge plans. Information verified: address, contacts, phone number, , insurance Yes  Insurance Provider: [facility]    Emergency Contact/Next of Kin name & number: pt's wife Harshad García [phone]  Who are involved in patient's support system? Pt's wife    PCP: Latisha Rojas MD  Date of last visit: 2 weeks ago      Discharge Planning    Living Arrangements:  Spouse/Significant Other     Home has 2 stories  Pt uses ramp to get into front door, one flight of stairs to climb to reach second floor  Location of bedroom/bathroom in home is on the main level    Patient able to perform ADL's:Independent    Current Services (outpatient & in home) none  DME equipment: cane, nebulizer  DME provider: n/a    Is patient receiving oral anticoagulation therapy? Yes, unsure which one    If indicated:   Physician managing anticoagulation treatment: n/a  Where does patient obtain lab work for ATC treatment? n/a      Potential Assistance Needed:  N/A    Patient agreeable to home care: No  [place] of choice provided:  n/a    Prior SNF/Rehab Placement and Facility: none  Agreeable to SNF/Rehab: No  [place] of choice provided: n/a     Evaluation: n/a    Expected Discharge date:  21    Patient expects to be discharged to: If home: is the family and/or caregiver wiling & able to provide support at home? yes  Who will be providing this support? wife    Follow Up Appointment: Best Day/ Time:      Transportation provider: wife  Transportation arrangements needed for discharge: No    Readmission Risk              Risk of Unplanned Readmission:  8         Does patient have a readmission risk score greater than 14?: No  If yes, follow-up appointment must be made within 7 days of discharge.      Goals of Care: Safety      Educated pt on transitional options, provided freedom of choice and he is  agreeable with plan      Discharge Plan: Home independently, wife will transport, has established PCp    Electronically signed by Christo Deutsch RN on 11/11/21 at 6:27 PM EST

## 2021-11-11 NOTE — CONSULTS
education level: Not on file   Occupational History    Not on file   Tobacco Use    Smoking status: Never Smoker    Smokeless tobacco: Never Used   Substance and Sexual Activity    Alcohol use: Not Currently    Drug use: Not on file    Sexual activity: Not on file   Other Topics Concern    Not on file   Social History Narrative    Not on file     Social Determinants of Health     Financial Resource Strain:     Difficulty of Paying Living Expenses: Not on file   Food Insecurity:     Worried About Running Out of Food in the Last Year: Not on file    [place] of Food in the Last Year: Not on file   Transportation Needs:     Lack of Transportation (Medical): Not on file    Lack of Transportation (Non-Medical): Not on file   Physical Activity:     Days of Exercise per Week: Not on file    Minutes of Exercise per Session: Not on file   Stress:     Feeling of Stress : Not on file   Social Connections:     Frequency of Communication with Friends and Family: Not on file    Frequency of Social Gatherings with Friends and Family: Not on file    Attends Rastafari Services: Not on file    Active Member of [address] [place] [address] or Organizations: Not on file    Attends Club or Organization Meetings: Not on file    Marital Status: Not on file   Intimate Partner Violence:     Fear of Current or Ex-Partner: Not on file    Emotionally Abused: Not on file    Physically Abused: Not on file    Sexually Abused: Not on file   Housing Stability:     Unable to Pay for Housing in the Last Year: Not on file    Number of [place] in the Last Year: Not on file    Unstable Housing in the Last Year: Not on file       Family History:  No family history on file.   Previous Urologic Family history: none    REVIEW OF SYSTEMS:  Constitutional: none  Eyes: negative  Respiratory: negative  Cardiovascular: negative  Gastrointestinal: Positive for nausea and vomiting  Genitourinary: see HPI  Musculoskeletal: negative  Skin: negative Neurological: negative  Hematological/Lymphatic: negative  Psychological: negative    Physical Exam:    This a 45 y.o. male   Patient Vitals for the past 24 hrs:   BP Temp Temp src Pulse Resp SpO2   11/11/21 1702 (!) 152/106 98.7 °F (37.1 °C) Oral 104 16 93 %     Constitutional: Patient in no acute distress; Neuro: alert and oriented to person place and time. Psych: Mood and affect normal.  Skin: Normal  Lungs: Respiratory effort normal  Cardiovascular:  Normal peripheral pulses  Abdomen: Soft, non-tender, non-distended with no CVA, flank pain, hepatosplenomegaly or hernia. Kidneys normal. NG in place. Bladder non-tender and not distended.   Ext: WWP     LABS:  Recent Labs     11/10/21  1715   WBC 9.4   HGB 16.3   HCT 47.8   MCV 87.8        Recent Labs     11/10/21  1715      K 3.3*   CL 95*   CO2 19*   BUN 27*   CREATININE 1.57*     No results found for: PSA    Additional Lab/culture results:    Urinalysis:   Recent Labs     11/10/21  0033   COLORU Yellow   PHUR 5.0   WBCUA NOT REPORTED   RBCUA NOT REPORTED   MUCUS NOT REPORTED   TRICHOMONAS NOT REPORTED   YEAST NOT REPORTED   BACTERIA NOT REPORTED   SPECGRAV 1.015   LEUKOCYTESUR NEGATIVE   UROBILINOGEN Normal   BILIRUBINUR NEGATIVE  Verified by ictotest.*        -----------------------------------------------------------------  Imaging Results:  EXAMINATION: CTA CHEST ABDOMEN PELVIS W CONTRAST        HISTORY: Reason for exam:->epigastric to suprapubic pain, 10/10, diaphoretic,    worsening over two days       COMPARISON: None.            TECHNIQUE: CTA chest abdomen pelvis with and without IV contrast. MIP (maximum    intensity projection) images were performed.        Dose reduction techniques were achieved by using automated exposure control    and/or adjustment of mA and/or kV according to patient size and/or use of    iterative reconstruction technique.            FINDINGS:        PERTINENT POSITIVES: 5 mm obstructing stone proximal left ureter.       Diffuse increase in the diameter of the small bowel loops with somewhat long    air-fluid levels. Bowel loops measure up to 3 cm in diameter. Prominent amount    of fluid in the stomach.       PERTINENT NEGATIVES: No intramural hematoma in the aorta and no dissection. No    free air or free fluid. No hydronephrosis.         COINCIDENTAL FINDINGS: Prior hernia repairs in the inguinal regions. Pars    defects L5 without spondylolisthesis.       ROUTINE EXAMINATION: Mild degenerative change L4-L5 and L5-S1. No pericardial    effusion. No mediastinal mass or hilar adenopathy.       Normal liver, spleen, pancreas, gallbladder, adrenal glands and kidneys. Mild    plaque aorta without aneurysm. Normal appendix.       Moderate stool in the colon without obstruction. Bladder contour normal.    Normal-sized prostate.               Impression   5 mm obstructing stone proximal left ureter without hydronephrosis.       No aortic dissection.       Diffuse ileus small bowel with large amount of fluid in the stomach. Nonobstructive ileus considered most likely, but partial obstruction not    completely excluded.       Consider small bowel follow-through if clinically indicated. Assessment and Plan   Impression:    46 yo M with sbo v ileus with NG in place.  Incidentally found to have 5mm L ureteral stone without hydro or pain    Plan:   -possible intervention tomorrow but continue to monitor for now as this may just be an asymptomatic stone  -PVRs to ensure bladder emptying  -monitor Cr which could be elevated due to vomiting SBO dehydration (prerenal etiology)   -on cipro and flomax, continue     Aj Pollack MD  5:12 PM 11/11/2021

## 2021-11-11 NOTE — ED NOTES
Pt C/O chest/epigastric pain rating pain a 8 out of 10. Dr notified. Donato VASQUEZ, RN  11/11/21 8038

## 2021-11-11 NOTE — ED NOTES
Pt supine in bed with eyes closed. Pt states that he feels better. Pt denies any needs at this time. Pt wife at bedside. Pt call light within reach. Donato VASQUEZ RN  11/11/21 0001

## 2021-11-11 NOTE — ED NOTES
Attempted to call report to [facility]. V's with no answer.       Anitha Massey RN  11/11/21 1843

## 2021-11-11 NOTE — H&P
[facility]  Office: [address], DO, Joel Mitchell, DO, Ashanti Lee, DO, Coreen Franklin, DO, Becky Art MD, Luz Campoverde MD, Tiff Duncan MD, Claudean Millard, MD, Ashleigh Quezada MD, Rochelle Knight MD, Farnaz Camargo MD, Phan Rudolph, DO, Holly Orta DO, Yesenia Geiger MD,  Beverly Armendariz DO, Daren Deleon MD, Valrey Mckeon MD, Siena Grey MD, Everett Goodrich MD, Torsten Gaona MD, Melissa Hernandez MD, Marielena Main MD, Sonali Awan [facility], [facility], CNP, Ramona Lane, CNP, Portillo Casey, CNS, Angélica Dior, CNP, Katelin Todd, CNP, Rigoberto Higginbotham, CNP, Jacek Pascual, CNP, Awilda Cortez, CNP, Netta Munoz PA-C, Sergio Garcia, [facility], Edis Holly, CNP, Prudence Fitzpatrick, CNP, Jaun Suarez, CNP, Geoff Verduzco, CNP, Marsha Izaguirre, CNP, Mamta Bui, CNP, Tim Saint, [facility]   [address]    HISTORY AND PHYSICAL EXAMINATION            Date:   11/11/2021  Patient name:  Sara Brooks  Date of admission:  11/11/2021  4:14 PM  MRN:   [id number]  Account:  [de-identified]  YOB: 1970  PCP:    Anastacia Candelaria MD  Room:   0589/2784-81  Code Status:    Full Code    Chief Complaint:     Abdominal pain    History Obtained From:     patient, electronic medical record    History of Present Illness:     Patient has been transferred from [facility] with following information:    Sara Brooks is a 45 y.o. male who presents to the emergency room via private vehicle, patient complaining of abdominal pain indicating epigastric rating down to the suprapubic, 10 of 10, causing him to be hard to breathe and short of breath, some associated nausea vomiting. Onset day prior. Patient denies any diarrhea, denies trauma falls, denies a history of kidney stones, denies any dysuria or hematuria, [place] any history of aneurysms.   Nothing is helped his pain.     Patient states he was not having bowel movement for the mg by mouth daily  21  Historical Provider, MD   naproxen (NAPROSYN) 500 MG tablet Take 1 tablet by mouth 2 times daily (with meals) 20   Adrián Bazan MD        Allergies:     Patient has no known allergies. Social History:     Tobacco:    reports that he has never smoked. He has never used smokeless tobacco.  Alcohol:      reports previous alcohol use. Drug Use:  has no history on file for drug use. Family History:     No family history on file. Review of Systems:     Positive and Negative as described in HPI.     CONSTITUTIONAL:  negative for fevers, chills, sweats, fatigue, weight loss  HEENT:  negative for vision, hearing changes, runny nose, throat pain  RESPIRATORY:  negative for shortness of breath, cough, congestion, wheezing  CARDIOVASCULAR:  negative for chest pain, palpitations  GASTROINTESTINAL: + for nausea, vomiting,constipation, change in bowel habits, abdominal pain   GENITOURINARY:  + Hesitancy for 1 day, denies burning with urination, frequency   INTEGUMENT:  negative for rash, skin lesions, easy bruising   HEMATOLOGIC/LYMPHATIC:  negative for swelling/edema   ALLERGIC/IMMUNOLOGIC:  negative for urticaria , itching  ENDOCRINE:  negative increase in drinking, increase in urination, hot or cold intolerance  MUSCULOSKELETAL:  negative joint pains, muscle aches, swelling of joints  NEUROLOGICAL:  negative for headaches, dizziness, lightheadedness, numbness, pain, tingling extremities  BEHAVIOR/PSYCH:  negative for depression, anxiety    Physical Exam:   BP (!) 152/106   Pulse 104   Temp 98.7 °F (37.1 °C) (Oral)   Resp 16   SpO2 93%   Temp (24hrs), Av.7 °F (37.1 °C), Min:98.7 °F (37.1 °C), Max:98.7 °F (37.1 °C)    Recent Labs     11/10/21  1704 21  1645   POCGLU 133* 143*     No intake or output data in the 24 hours ending 21 1709    General Appearance: alert, well appearing, and in no acute distress, NG tube in place  Mental status: oriented to person, place, and time  Head: normocephalic, atraumatic  Eye: no icterus, redness, pupils equal and reactive, extraocular eye movements intact, conjunctiva clear  Ear: normal external ear, no discharge, hearing intact  Nose: no drainage noted  Mouth: mucous membranes moist  Neck: supple, no carotid bruits, thyroid not palpable  Lungs: Bilateral equal air entry, clear to ausculation, no wheezing, rales or rhonchi, normal effort  Cardiovascular: normal rate, regular rhythm, no murmur, gallop, rub  Abdomen: Soft, + discomfort in mid epigastrium and periumbilical region, nondistended, slightly hyperactive bowel sounds, no hepatomegaly or splenomegaly  Neurologic: There are no new focal motor or sensory deficits, normal muscle tone and bulk, no abnormal sensation, normal speech, cranial nerves II through XII grossly intact  Skin: No gross lesions, rashes, bruising or bleeding on exposed skin area  Extremities: peripheral pulses palpable, no pedal edema or calf pain with palpation  Psych: normal affect    Investigations:      Laboratory Testing:  Recent Results (from the past 24 hour(s))   CBC Auto Differential    Collection Time: 11/10/21  5:15 PM   Result Value Ref Range    WBC 9.4 3.5 - 11.0 k/uL    RBC 5.45 4.5 - 5.9 m/uL    Hemoglobin 16.3 13.5 - 17.5 g/dL    Hematocrit 47.8 41 - 53 %    MCV 87.8 80 - 100 fL    MCH 30.0 26 - 34 pg    MCHC 34.2 31 - 37 g/dL    RDW 13.4 12.1 - 15.2 %    Platelets 005 080 - 324 k/uL    MPV NOT REPORTED 6.0 - 12.0 fL    NRBC Automated NOT REPORTED per 100 WBC    Differential Type YES     Seg Neutrophils 68 39 - 75 %    Lymphocytes 23 13 - 44 %    Monocytes 6 5 - 9 %    Eosinophils % 2 0 - 5 %    Basophils 1 0 - 2 %    Immature Granulocytes NOT REPORTED 0 %    Segs Absolute 6.50 2.1 - 6.5 k/uL    Absolute Lymph # 2.10 1.0 - 4.8 k/uL    Absolute Mono # 0.60 0.0 - 1.0 k/uL    Absolute Eos # 0.20 0.0 - 0.4 k/uL    Basophils Absolute 0.10 0.0 - 0.2 k/uL    Absolute Immature Granulocyte NOT REPORTED 0.00 - 0.30 k/uL    WBC Morphology NOT REPORTED     RBC Morphology NOT REPORTED     Platelet Estimate NOT REPORTED    Basic Metabolic Panel w/ Reflex to MG    Collection Time: 11/10/21  5:15 PM   Result Value Ref Range    Glucose 137 (H) 70 - 99 mg/dL    BUN 27 (H) 6 - 20 mg/dL    CREATININE 1.57 (H) 0.70 - 1.20 mg/dL    Bun/Cre Ratio 17 9 - 20    Calcium 10.8 (H) 8.6 - 10.4 mg/dL    Sodium 135 135 - 144 mmol/L    Potassium 3.3 (L) 3.7 - 5.3 mmol/L    Chloride 95 (L) 98 - 107 mmol/L    CO2 19 (L) 20 - 31 mmol/L    Anion Gap 21 (H) 9 - 17 mmol/L    GFR Non-African American 47 (L) >60 mL/min    GFR  57 (L) >60 mL/min    GFR Comment          GFR Staging NOT REPORTED    Troponin    Collection Time: 11/10/21  5:15 PM   Result Value Ref Range    Troponin, High Sensitivity 20 0 - 22 ng/L    Troponin T NOT REPORTED <0.03 ng/mL    Troponin Interp NOT REPORTED    Brain Natriuretic Peptide    Collection Time: 11/10/21  5:15 PM   Result Value Ref Range    Pro-BNP 73 <300 pg/mL    BNP Interpretation NOT REPORTED    Protime-INR    Collection Time: 11/10/21  5:15 PM   Result Value Ref Range    Protime 13.2 11.5 - 14.2 sec    INR 1.0    TYPE AND SCREEN    Collection Time: 11/10/21  5:15 PM   Result Value Ref Range    Expiration Date 11/13/2021,8769     Arm Band Number NOT REPORTED     ABO/Rh B POSITIVE     Antibody Screen NEGATIVE    Lipase    Collection Time: 11/10/21  5:15 PM   Result Value Ref Range    Lipase 69 (H) 13 - 60 U/L   Magnesium    Collection Time: 11/10/21  5:15 PM   Result Value Ref Range    Magnesium 2.5 1.6 - 2.6 mg/dL   Lactic Acid    Collection Time: 11/10/21  6:11 PM   Result Value Ref Range    Lactic Acid 2.8 (H) 0.5 - 2.2 mmol/L   Hepatic Function Panel    Collection Time: 11/10/21  6:11 PM   Result Value Ref Range    Albumin 4.2 3.5 - 5.2 g/dL    Alkaline Phosphatase 53 40 - 129 U/L    ALT 35 5 - 41 U/L    AST 23 <40 U/L    Total Bilirubin 0.44 0.30 - 1.20 mg/dL    Bilirubin, Direct <0.08 <0.31 mg/dL    Bilirubin, Indirect Connot be calculated 0.00 - 1.00 mg/dL    Total Protein 7.3 6.4 - 8.3 g/dL    Globulin NOT REPORTED 1.5 - 3.8 g/dL    Albumin/Globulin Ratio NOT REPORTED 1.0 - 2.5   Troponin    Collection Time: 11/10/21  6:11 PM   Result Value Ref Range    Troponin, High Sensitivity 16 0 - 22 ng/L    Troponin T NOT REPORTED <0.03 ng/mL    Troponin Interp NOT REPORTED    COVID-19, Rapid    Collection Time: 11/10/21  7:09 PM    Specimen: Nasopharyngeal Swab   Result Value Ref Range    Specimen Description . NASOPHARYNGEAL SWAB     SARS-CoV-2, Rapid Not Detected Not Detected   Lactate, Sepsis    Collection Time: 11/11/21  4:34 PM   Result Value Ref Range    Lactic Acid, Sepsis NOT REPORTED 0.5 - 1.9 mmol/L    Lactic Acid, Sepsis, Whole Blood 1.1 0.5 - 1.9 mmol/L   POC Glucose Fingerstick    Collection Time: 11/11/21  4:45 PM   Result Value Ref Range    POC Glucose 143 (H) 75 - 110 mg/dL       Imaging/Diagnostics:  XR CHEST PORTABLE    Result Date: 11/10/2021  Negative chest.     CTA CHEST ABDOMEN PELVIS W CONTRAST    Result Date: 11/10/2021  5 mm obstructing stone proximal left ureter without hydronephrosis. No aortic dissection. Diffuse ileus small bowel with large amount of fluid in the stomach. Nonobstructive ileus considered most likely, but partial obstruction not completely excluded. Consider small bowel follow-through if clinically indicated. Assessment :      Hospital Problems           Last Modified POA    * (Principal) Pain of upper abdomen 11/11/2021 Yes    Partial small bowel obstruction ([address] 11/11/2021 Yes    Left ureteral stone-obstructing without hydronephrosis 11/11/2021 Yes    Diabetes mellitus type 2 in obese ([address] 11/11/2021 Yes    Essential hypertension 11/11/2021 Yes    History of hernia surgery-bilateral 11/11/2021 Yes    HATTIE (acute kidney injury) ([address] 11/11/2021 Yes          Plan:     Patient status inpatient in the Progressive Unit/Step down    1.  Keep n.p.o. 2. Continue IV fluids and IV Cipro which was already started  3. NGT to LI WS per surgery plan  4. Hold all oral home medicines  5. Low intensity insulin sliding scale and check A1c  6. Hydralazine as needed for blood pressure control  7. Pain control with small dose of morphine as needed  8. Surgery urology have already been consulted  9. Monitor labs in morning    Consultations:   IP CONSULT TO UROLOGY  IP CONSULT TO GENERAL SURGERY     Patient is admitted as inpatient status because of co-morbidities listed above, severity of signs and symptoms as outlined, requirement for current medical therapies and most importantly because of direct risk to patient if care not provided in a hospital setting. Expected length of stay > 48 hours.     Melinda Estrella MD  11/11/2021  5:09 PM    Copy sent to Dr. Rodo Hernandez MD

## 2021-11-11 NOTE — CONSULTS
General Surgery  Consult    PATIENT NAME: Jamal Ladd  AGE: 45 y.o. MEDICAL RECORD NO. [id number]  DATE: 11/11/2021  SURGEON: Dr. Carlton Suh: Chadwick Osorio MD    Patient evaluated at the request of  Dr. Elvie Mcgill  Reason for evaluation: Ileus, pos SBO    Patient information was obtained from patient. History/Exam limitations: none. IMPRESSION:     Hydronephrosis  Renal calculus  Ileus      PLAN:   Continue medical management per primary  Recommend repletion of electrolytes  Fluid hydration  NPO, NGT to LIS  Will proceed with repeat AM KUB  No surgical intervention at this time, will continue to monitor closely for return of bowel function      HISTORY:   History of Chief Complaint:    Jamal Ladd is a 45 y.o. male with a history of hypertension, diabetes, bilateral open inguinal hernia repairs with mesh [facility]. The patient states that the abdominal pain is located in the upper half of his abdomen and is constant in nature. He states that he has not had a bowel movement in 4 days. He states that yesterday he became nauseous and started vomiting. He does endorse flatus this afternoon. He denies any dysuria, hematuria. Denies any fever, chills, chest pain, shortness of breath. Past Medical History   has a past medical history of Diabetes mellitus ([address] and Hypertension. Past Surgical History   has a past surgical history that includes hernia repair. Medications  Prior to Admission medications    Medication Sig Start Date End Date Taking?  Authorizing Provider   meloxicam (MOBIC) 7.5 MG tablet Take 7.5 mg by mouth daily    Historical Provider, MD   insulin lispro (HUMALOG) 100 UNIT/ML injection vial Inject into the skin 3 times daily (before meals) Breakfast is 10+sliding, lunch 15+sliding, dinner 20+sliding    Historical Provider, MD   atorvastatin (LIPITOR) 10 MG tablet Take 10 mg by mouth daily    Historical Provider, MD   SITagliptin (JANUVIA) 100 MG tablet Take 100 mg by mouth daily    Historical Provider, MD   insulin glargine (LANTUS) 100 UNIT/ML injection vial Inject 50 Units into the skin nightly     Historical Provider, MD   lisinopril-hydroCHLOROthiazide (PRINZIDE;ZESTORETIC) 20-25 MG per tablet Take 1 tablet by mouth daily 1/14/21   Arjun Villasenor MD   lisinopril (PRINIVIL;ZESTRIL) 20 MG tablet Take 20 mg by mouth daily  1/14/21  Historical Provider, MD   naproxen (NAPROSYN) 500 MG tablet Take 1 tablet by mouth 2 times daily (with meals) 12/30/20   Rosa M Cunningham MD    Scheduled Meds:   ciprofloxacin  400 mg IntraVENous Q12H    sodium chloride flush  5-40 mL IntraVENous 2 times per day    tamsulosin  0.4 mg Oral Daily    insulin lispro  0-6 Units SubCUTAneous Q4H    heparin (porcine)  5,000 Units SubCUTAneous 3 times per day    insulin lispro  0-6 Units SubCUTAneous TID WC    insulin lispro  0-3 Units SubCUTAneous Nightly     Continuous Infusions:   dextrose      sodium chloride      sodium chloride       PRN Meds:.dextrose, dextrose, glucagon (rDNA), glucose, sodium chloride, acetaminophen, HYDROcodone 5 mg - acetaminophen **OR** HYDROcodone 5 mg - acetaminophen, morphine **OR** morphine, ondansetron **OR** ondansetron, polyethylene glycol, sodium chloride flush, hydrALAZINE  Allergies  has No Known Allergies. Family History  family history is not on file. Social History   reports that he has never smoked. He has never used smokeless tobacco.   reports previous alcohol use.   has no history on file for drug use.   Review of Systems  General Denies any fever or chills  HEENT  Denies any diplopia, tinnitus or vertigo  Resp Denies any shortness of breath, cough or wheezing  Cardiac Denies any chest pain, palpitations, claudication or edema  GI + abdominal pain   Denies any frequency, urgency, hesitancy or incontinence  Heme Denies bruising or bleeding easily  Endocrine + diabetes  Neuro Denies any focal motor or sensory deficits    PHYSICAL:   VITALS:  oral temperature is 98.7 °F (37.1 °C). His blood pressure is 152/106 (abnormal) and his pulse is 104. His respiration is 16 and oxygen saturation is 93%. CONSTITUTIONAL: Alert and oriented times 3, no acute distress and cooperative to examination. HEENT: Head is normocephalic, atraumatic. EOMI, PERRLA  NECK: Soft, trachea midline and straight  LUNGS: Unlabored respirations  CARDIOVASCULAR: Mildly tachycardic rate, regular rhythm  ABDOMEN: soft, nondistended, moderately tender in the epigastrium without peritoneal signs, well healed bilateral inguinal incisions  NEUROLOGIC: CN II-XII are grossly intact. There are no focalizing motor or sensory deficits  EXTREMITIES: no cyanosis, clubbing or edema    LABS:     Recent Labs     11/10/21  0033 11/10/21  1715 11/10/21  1811   WBC  --  9.4  --    HGB  --  16.3  --    HCT  --  47.8  --    PLT  --  274  --    NA  --  135  --    K  --  3.3*  --    CL  --  95*  --    CO2  --  19*  --    BUN  --  27*  --    CREATININE  --  1.57*  --    MG  --  2.5  --    CALCIUM  --  10.8*  --    INR  --  1.0  --    AST  --   --  23   ALT  --   --  35   BILITOT  --   --  0.44   BILIDIR  --   --  <0.08   NITRU NEGATIVE  --   --    COLORU Yellow  --   --    BACTERIA NOT REPORTED  --   --      Recent Labs     11/10/21  1715 11/10/21  1811   ALKPHOS  --  53   ALT  --  35   AST  --  23   BILITOT  --  0.44   BILIDIR  --  <0.08   LABALBU  --  4.2   LIPASE 69*  --        Marisela Romano MD  11/11/2021, 5:14 PM      Attending Note      I have reviewed the above [facility] note(s) and I either performed the key elements of the medical history and physical exam or was present with the surgery resident when the key elements of the medical history and physical exam were performed. I have discussed the findings, established the care plan and recommendations with the surgical team.  CT scans, labs reviewed.   Probable ileus, Correct lytes, continue NG decompression, will re-eval in am.    Laura Curtis MD  11/11/2021  6:43 PM

## 2021-11-11 NOTE — ED NOTES
Pt supine in bed with eyes closed. No visible signs of distress. Pt call light within reach. Donato Hanley RN  11/11/21 [id number]

## 2021-11-11 NOTE — ED NOTES
1050 mL total of green gastric content drained from NG into canister. Donato VASQUEZ, RN  11/11/21 3706

## 2021-11-11 NOTE — ED NOTES
Pt high fowlers in bed. Pt given 4oz of ice chips. Pt states that his abdomen feels \"better. \" Pt denies any other needs at this time. Donato VASQUEZ RN  11/11/21 1844

## 2021-11-12 ENCOUNTER — APPOINTMENT (OUTPATIENT)
Dept: GENERAL RADIOLOGY | Age: 51
DRG: 389 | End: 2021-11-12
Attending: INTERNAL MEDICINE
Payer: MEDICARE

## 2021-11-12 PROBLEM — E11.65 UNCONTROLLED TYPE 2 DIABETES MELLITUS WITH HYPERGLYCEMIA (HCC): Status: ACTIVE | Noted: 2021-11-12

## 2021-11-12 LAB
ANION GAP SERPL CALCULATED.3IONS-SCNC: 12 MMOL/L (ref 9–17)
BUN BLDV-MCNC: 24 MG/DL (ref 6–20)
BUN/CREAT BLD: ABNORMAL (ref 9–20)
CALCIUM SERPL-MCNC: 7.8 MG/DL (ref 8.6–10.4)
CHLORIDE BLD-SCNC: 105 MMOL/L (ref 98–107)
CO2: 20 MMOL/L (ref 20–31)
CREAT SERPL-MCNC: 0.98 MG/DL (ref 0.7–1.2)
GFR AFRICAN AMERICAN: >60 ML/MIN
GFR NON-AFRICAN AMERICAN: >60 ML/MIN
GFR SERPL CREATININE-BSD FRML MDRD: ABNORMAL ML/MIN/{1.73_M2}
GFR SERPL CREATININE-BSD FRML MDRD: ABNORMAL ML/MIN/{1.73_M2}
GLUCOSE BLD-MCNC: 122 MG/DL (ref 75–110)
GLUCOSE BLD-MCNC: 130 MG/DL (ref 75–110)
GLUCOSE BLD-MCNC: 153 MG/DL (ref 75–110)
GLUCOSE BLD-MCNC: 167 MG/DL (ref 75–110)
GLUCOSE BLD-MCNC: 181 MG/DL (ref 70–99)
GLUCOSE BLD-MCNC: 210 MG/DL (ref 75–110)
GLUCOSE BLD-MCNC: 213 MG/DL (ref 75–110)
HCT VFR BLD CALC: 38.6 % (ref 40.7–50.3)
HEMOGLOBIN: 12.5 G/DL (ref 13–17)
MAGNESIUM: 2.1 MG/DL (ref 1.6–2.6)
MCH RBC QN AUTO: 30.8 PG (ref 25.2–33.5)
MCHC RBC AUTO-ENTMCNC: 32.4 G/DL (ref 28.4–34.8)
MCV RBC AUTO: 95.1 FL (ref 82.6–102.9)
NRBC AUTOMATED: 0 PER 100 WBC
PDW BLD-RTO: 12.6 % (ref 11.8–14.4)
PLATELET # BLD: 220 K/UL (ref 138–453)
PMV BLD AUTO: 10.4 FL (ref 8.1–13.5)
POTASSIUM SERPL-SCNC: 4 MMOL/L (ref 3.7–5.3)
RBC # BLD: 4.06 M/UL (ref 4.21–5.77)
SODIUM BLD-SCNC: 137 MMOL/L (ref 135–144)
WBC # BLD: 5.7 K/UL (ref 3.5–11.3)

## 2021-11-12 PROCEDURE — 6370000000 HC RX 637 (ALT 250 FOR IP): Performed by: STUDENT IN AN ORGANIZED HEALTH CARE EDUCATION/TRAINING PROGRAM

## 2021-11-12 PROCEDURE — 82947 ASSAY GLUCOSE BLOOD QUANT: CPT

## 2021-11-12 PROCEDURE — 6370000000 HC RX 637 (ALT 250 FOR IP): Performed by: NURSE PRACTITIONER

## 2021-11-12 PROCEDURE — 6360000002 HC RX W HCPCS: Performed by: INTERNAL MEDICINE

## 2021-11-12 PROCEDURE — 6360000002 HC RX W HCPCS: Performed by: NURSE PRACTITIONER

## 2021-11-12 PROCEDURE — 83735 ASSAY OF MAGNESIUM: CPT

## 2021-11-12 PROCEDURE — 74018 RADEX ABDOMEN 1 VIEW: CPT

## 2021-11-12 PROCEDURE — 85027 COMPLETE CBC AUTOMATED: CPT

## 2021-11-12 PROCEDURE — 80048 BASIC METABOLIC PNL TOTAL CA: CPT

## 2021-11-12 PROCEDURE — 36415 COLL VENOUS BLD VENIPUNCTURE: CPT

## 2021-11-12 PROCEDURE — 6370000000 HC RX 637 (ALT 250 FOR IP): Performed by: INTERNAL MEDICINE

## 2021-11-12 PROCEDURE — 87086 URINE CULTURE/COLONY COUNT: CPT

## 2021-11-12 PROCEDURE — 1200000000 HC SEMI PRIVATE

## 2021-11-12 PROCEDURE — 2580000003 HC RX 258: Performed by: NURSE PRACTITIONER

## 2021-11-12 PROCEDURE — 99232 SBSQ HOSP IP/OBS MODERATE 35: CPT | Performed by: INTERNAL MEDICINE

## 2021-11-12 RX ORDER — INSULIN GLARGINE 100 [IU]/ML
25 INJECTION, SOLUTION SUBCUTANEOUS NIGHTLY
Status: DISCONTINUED | OUTPATIENT
Start: 2021-11-12 | End: 2021-11-13 | Stop reason: HOSPADM

## 2021-11-12 RX ADMIN — POLYETHYLENE GLYCOL 3350 17 G: 17 POWDER, FOR SOLUTION ORAL at 10:15

## 2021-11-12 RX ADMIN — CIPROFLOXACIN 400 MG: 2 INJECTION, SOLUTION INTRAVENOUS at 16:45

## 2021-11-12 RX ADMIN — SODIUM CHLORIDE: 9 INJECTION, SOLUTION INTRAVENOUS at 03:47

## 2021-11-12 RX ADMIN — TAMSULOSIN HYDROCHLORIDE 0.4 MG: 0.4 CAPSULE ORAL at 08:36

## 2021-11-12 RX ADMIN — CIPROFLOXACIN 400 MG: 2 INJECTION, SOLUTION INTRAVENOUS at 04:31

## 2021-11-12 RX ADMIN — INSULIN GLARGINE 25 UNITS: 100 INJECTION, SOLUTION SUBCUTANEOUS at 21:55

## 2021-11-12 RX ADMIN — BISACODYL 10 MG: 10 SUPPOSITORY RECTAL at 08:55

## 2021-11-12 RX ADMIN — HEPARIN SODIUM 5000 UNITS: 5000 INJECTION INTRAVENOUS; SUBCUTANEOUS at 14:41

## 2021-11-12 RX ADMIN — HEPARIN SODIUM 5000 UNITS: 5000 INJECTION INTRAVENOUS; SUBCUTANEOUS at 21:55

## 2021-11-12 RX ADMIN — SODIUM CHLORIDE: 9 INJECTION, SOLUTION INTRAVENOUS at 21:55

## 2021-11-12 ASSESSMENT — ENCOUNTER SYMPTOMS
ABDOMINAL PAIN: 0
NAUSEA: 0
COUGH: 0
VOMITING: 0
SHORTNESS OF BREATH: 0
DIARRHEA: 1

## 2021-11-12 NOTE — CARE COORDINATION
Transitional planning. Spoke to pt, he will need ride home tomorrow, willing to pay for cab, provided pt with sweatshirt from [facility] donation room. Called [employer], Will cost $210 for cab ride home to pt's address in [facility] informed pt of above information, he stated his wife will now transport him home.

## 2021-11-12 NOTE — PROGRESS NOTES
Urology Progress Note  CC:  Kidney stone  Subjective:   Giovanna Hamman is a 45 y.o. male. His/Her current Diet is: Diet NPO. The patient is * No surgery found * from   No acute urologic events overnight. No chest pain, shortness of breath,  vomiting, fevers, chills. Some nauseas     is passing flatus  is not having bowel movements  Not having flank pain.  Reports epigastric dyscomfort   cc in 24 hr    Patient Vitals for the past 24 hrs:   BP Temp Temp src Pulse Resp SpO2   11/11/21 2104 136/81 98.4 °F (36.9 °C) Axillary 108 18 (!) 89 %   11/11/21 1702 (!) 152/106 98.7 °F (37.1 °C) Oral 104 16 93 %       Intake/Output Summary (Last 24 hours) at 11/12/2021 0556  Last data filed at 11/12/2021 0033  Gross per 24 hour   Intake --   Output 1029 ml   Net -1029 ml       Recent Labs     11/10/21  1715 11/12/21  0519   WBC 9.4 5.7   HGB 16.3 12.5*   HCT 47.8 38.6*   MCV 87.8 95.1    220     Recent Labs     11/10/21  1715      K 3.3*   CL 95*   CO2 19*   BUN 27*   CREATININE 1.57*       Recent Labs     11/10/21  0033   COLORU Yellow   PHUR 5.0   WBCUA NOT REPORTED   RBCUA NOT REPORTED   MUCUS NOT REPORTED   TRICHOMONAS NOT REPORTED   YEAST NOT REPORTED   BACTERIA NOT REPORTED   SPECGRAV 1.015   LEUKOCYTESUR NEGATIVE   UROBILINOGEN Normal   BILIRUBINUR NEGATIVE  Verified by ictotest.*       Current Facility-Administered Medications   Medication Dose Route Frequency Provider Last Rate Last Admin    dextrose 5 % solution  100 mL/hr IntraVENous PRN Bhupindererick Keviner, APRN - CNP        dextrose 50 % IV solution  12.5 g IntraVENous PRN Bhupindererick Nakita, APRN - CNP        glucagon (rDNA) injection 1 mg  1 mg IntraMUSCular PRN Bhupindererick Nakita, APRN - CNP        glucose (GLUTOSE) 40 % oral gel 15 g  15 g Oral PRN Bhupindererick Nakita, APRN - CNP        0.9 % sodium chloride infusion   IntraVENous Continuous Freclaudiaerick Nakita APRN -  mL/hr at 11/12/21 0347 New Bag at 11/12/21 0347    0.9 % sodium chloride infusion  25 mL IntraVENous PRN BIJAL Asher CNP        acetaminophen (TYLENOL) tablet 650 mg  650 mg Oral Q4H PRN BIJAL Asher CNP        ciprofloxacin (CIPRO) IVPB 400 mg  400 mg IntraVENous Q12H BIJAL Asher  mL/hr at 11/12/21 0431 400 mg at 11/12/21 0431    HYDROcodone-acetaminophen (NORCO) 5-325 MG per tablet 1 tablet  1 tablet Oral Q4H PRN BIJAL Asher CNP        Or    HYDROcodone-acetaminophen (NORCO) 5-325 MG per tablet 2 tablet  2 tablet Oral Q4H PRN BIJAL Asher CNP        ondansetron (ZOFRAN-ODT) disintegrating tablet 4 mg  4 mg Oral Q8H PRN BIJAL Asher CNP        Or    [facility]danset[facility] injection 4 mg  4 mg IntraVENous Q6H PRN BIJAL Asher CNP        polyethylene glycol (GLYCOLAX) packet 17 g  17 g Oral Daily PRN BIJAL Asher CNP        sodium chloride flush 0.9 % injection 5-40 mL  5-40 mL IntraVENous 2 times per day BIJAL Asher CNP        sodium chloride flush 0.9 % injection 5-40 mL  5-40 mL IntraVENous PRN BIJAL Asher CNP        [facility] capsule 0.4 mg  0.4 mg Oral Daily BIJAL Asher CNP   0.4 mg at 11/11/21 1853    insulin lispro (HUMALOG) injection vial 0-6 Units  0-6 Units SubCUTAneous Q4H BIJAL Asher CNP        heparin (porcine) injection 5,000 Units  5,000 Units SubCUTAneous 3 times per day Marianne Vásquez MD        insulin lispro (HUMALOG) injection vial 0-6 Units  0-6 Units SubCUTAneous TID  Denzel Solares MD        insulin lispro (HUMALOG) injection vial 0-3 Units  0-3 Units SubCUTAneous Nightly Marianne Vásquez MD   1 Units at 11/11/21 2158    hydrALAZINE (APRESOLINE) injection 10 mg  10 mg IntraVENous Q4H PRN Denzel Solares MD        morphine (PF) injection 0.5 mg  0.5 mg IntraVENous Q4H PRN Marianne Vásquez MD        Or    morphine (PF)

## 2021-11-12 NOTE — PROGRESS NOTES
PROGRESS NOTE      PATIENT NAME: Emilee Li  MEDICAL RECORD NO. [id number]  DATE: 2021  SURGEON: Dr. Patricia Ramos: Bacilio Combs MD    HD: # 1    ASSESSMENT    Patient Active Problem List   Diagnosis    Hydronephrosis with renal calculous obstruction    Pain of upper abdomen    Partial small bowel obstruction ([address]    Left ureteral stone-obstructing without hydronephrosis    Diabetes mellitus type 2 in obese [facility])    Essential hypertension    History of hernia surgery-bilateral    HATTIE (acute kidney injury) [facility])       MEDICAL DECISION MAKING AND PLAN    Emilee Li is a 45 y.o. male for abdominal pain. Imaging demonstrates hydronephrosis with renal calculus    Plan: Will follow-up tolerance of NG tube clamp trial   Diet: NPO  DVT ppx: None for now  Disposition: F/u clamp trial, possible removal later today  Will need outpatient work-up of possible gastroparesis    SUBJECTIVE    Emilee Li was seen and examined at bedside. Pt reports improving abdominal pain and nausea. So far he is tolerating a clamp trial of the NG tube. He is passing gas. Denies fever, chills, shortness of breath, chest pain. OBJECTIVE  VITALS: Temp: Temp: 97.7 °F (36.5 °C)Temp  Av.3 °F (36.8 °C)  Min: 97.7 °F (36.5 °C)  Max: 98.7 °F (99.5 °C) BP Systolic (05YTQ), AUK:696 , Min:112 , JCP:233   Diastolic (46AGJ), AMY:55, Min:70, Max:106   Pulse Pulse  Av  Min: 100  Max: 108 Resp Resp  Av.8  Min: 14  Max: 21 Pulse ox SpO2  Av.8 %  Min: 89 %  Max: 94 %      Physical Exam    CONSTITUTIONAL: Alert and oriented times 3, no acute distress and cooperative to examination. HEENT: Head is normocephalic, atraumatic.  EOMI, PERRLA  NECK: Soft, trachea midline and straight  LUNGS: Unlabored respirations  CARDIOVASCULAR: Mildly tachycardic rate, regular rhythm  ABDOMEN: soft, nondistended, moderately tender in the epigastrium without peritoneal signs, well healed bilateral inguinal incisions  NEUROLOGIC: CN II-XII are grossly intact. There are no focalizing motor or sensory deficits  EXTREMITIES: no cyanosis, clubbing or edema     Ins and outs: I/O last 3 completed shifts:  In: -   Out: 1029 [Urine:479; Emesis/NG output:550]    Drain/tube output: In: -   Out: [address] [Urine:479]    LAB:  CBC:   Recent Labs     11/10/21  1715 11/12/21  0519   WBC 9.4 5.7   HGB 16.3 12.5*   HCT 47.8 38.6*   MCV 87.8 95.1    220     BMP:   Recent Labs     11/10/21  1715 11/12/21  0519    137   K 3.3* 4.0   CL 95* 105   CO2 19* 20   BUN 27* 24*   CREATININE 1.57* 0.98   GLUCOSE 137* 181*     COAGS:   Recent Labs     11/10/21  1715 11/10/21  1811   PROT  --  7.3   INR 1.0  --        RADIOLOGY:  KUB: no evidence of bowel obstruction      Cheikh Cortes DO  11/12/21, 11:11 AM         Attending Note      I have reviewed the above [facility] note(s) and I either performed the key elements of the medical history and physical exam or was present with the surgery resident when the key elements of the medical history and physical exam were performed. I have discussed the findings, established the care plan and recommendations with the surgical team.  Passing flatus, abdomen soft.   NG clamping trial.    Edwardo Lr MD  11/12/2021  4:11 PM

## 2021-11-13 VITALS
TEMPERATURE: 97.9 F | SYSTOLIC BLOOD PRESSURE: 148 MMHG | OXYGEN SATURATION: 95 % | RESPIRATION RATE: 18 BRPM | DIASTOLIC BLOOD PRESSURE: 95 MMHG | HEART RATE: 91 BPM

## 2021-11-13 LAB
ANION GAP SERPL CALCULATED.3IONS-SCNC: 12 MMOL/L (ref 9–17)
BUN BLDV-MCNC: 14 MG/DL (ref 6–20)
BUN/CREAT BLD: ABNORMAL (ref 9–20)
CALCIUM SERPL-MCNC: 8.7 MG/DL (ref 8.6–10.4)
CHLORIDE BLD-SCNC: 103 MMOL/L (ref 98–107)
CO2: 20 MMOL/L (ref 20–31)
CREAT SERPL-MCNC: 0.81 MG/DL (ref 0.7–1.2)
CULTURE: NO GROWTH
GFR AFRICAN AMERICAN: >60 ML/MIN
GFR NON-AFRICAN AMERICAN: >60 ML/MIN
GFR SERPL CREATININE-BSD FRML MDRD: ABNORMAL ML/MIN/{1.73_M2}
GFR SERPL CREATININE-BSD FRML MDRD: ABNORMAL ML/MIN/{1.73_M2}
GLUCOSE BLD-MCNC: 146 MG/DL (ref 75–110)
GLUCOSE BLD-MCNC: 157 MG/DL (ref 70–99)
GLUCOSE BLD-MCNC: 193 MG/DL (ref 75–110)
HCT VFR BLD CALC: 37 % (ref 40.7–50.3)
HEMOGLOBIN: 12.3 G/DL (ref 13–17)
Lab: NORMAL
MCH RBC QN AUTO: 30.4 PG (ref 25.2–33.5)
MCHC RBC AUTO-ENTMCNC: 33.2 G/DL (ref 28.4–34.8)
MCV RBC AUTO: 91.6 FL (ref 82.6–102.9)
NRBC AUTOMATED: 0 PER 100 WBC
PDW BLD-RTO: 12.3 % (ref 11.8–14.4)
PLATELET # BLD: 195 K/UL (ref 138–453)
PMV BLD AUTO: 10 FL (ref 8.1–13.5)
POTASSIUM SERPL-SCNC: 4 MMOL/L (ref 3.7–5.3)
RBC # BLD: 4.04 M/UL (ref 4.21–5.77)
SODIUM BLD-SCNC: 135 MMOL/L (ref 135–144)
SPECIMEN DESCRIPTION: NORMAL
WBC # BLD: 4.5 K/UL (ref 3.5–11.3)

## 2021-11-13 PROCEDURE — 82947 ASSAY GLUCOSE BLOOD QUANT: CPT

## 2021-11-13 PROCEDURE — 2580000003 HC RX 258: Performed by: NURSE PRACTITIONER

## 2021-11-13 PROCEDURE — 6370000000 HC RX 637 (ALT 250 FOR IP): Performed by: INTERNAL MEDICINE

## 2021-11-13 PROCEDURE — 85027 COMPLETE CBC AUTOMATED: CPT

## 2021-11-13 PROCEDURE — 6370000000 HC RX 637 (ALT 250 FOR IP): Performed by: NURSE PRACTITIONER

## 2021-11-13 PROCEDURE — 6360000002 HC RX W HCPCS: Performed by: NURSE PRACTITIONER

## 2021-11-13 PROCEDURE — 80048 BASIC METABOLIC PNL TOTAL CA: CPT

## 2021-11-13 PROCEDURE — 99239 HOSP IP/OBS DSCHRG MGMT >30: CPT | Performed by: INTERNAL MEDICINE

## 2021-11-13 PROCEDURE — 36415 COLL VENOUS BLD VENIPUNCTURE: CPT

## 2021-11-13 PROCEDURE — 6360000002 HC RX W HCPCS: Performed by: INTERNAL MEDICINE

## 2021-11-13 RX ORDER — TAMSULOSIN HYDROCHLORIDE 0.4 MG/1
0.4 CAPSULE ORAL DAILY
Qty: 30 CAPSULE | Refills: 0 | Status: SHIPPED | OUTPATIENT
Start: 2021-11-14

## 2021-11-13 RX ADMIN — INSULIN LISPRO 2 UNITS: 100 INJECTION, SOLUTION INTRAVENOUS; SUBCUTANEOUS at 08:34

## 2021-11-13 RX ADMIN — INSULIN LISPRO 2 UNITS: 100 INJECTION, SOLUTION INTRAVENOUS; SUBCUTANEOUS at 14:04

## 2021-11-13 RX ADMIN — CIPROFLOXACIN 400 MG: 2 INJECTION, SOLUTION INTRAVENOUS at 05:05

## 2021-11-13 RX ADMIN — SODIUM CHLORIDE: 9 INJECTION, SOLUTION INTRAVENOUS at 05:05

## 2021-11-13 RX ADMIN — HEPARIN SODIUM 5000 UNITS: 5000 INJECTION INTRAVENOUS; SUBCUTANEOUS at 05:06

## 2021-11-13 RX ADMIN — TAMSULOSIN HYDROCHLORIDE 0.4 MG: 0.4 CAPSULE ORAL at 08:09

## 2021-11-13 NOTE — DISCHARGE SUMMARY
[facility]  Office: [address], DO, Heide Din, DO, Pato Acron, DO, Galilea Gauthier Blood, DO, Manpreet Blair MD, Tj Duke MD, Tigist Thompson MD, Annika Michelle MD, Jamie Valdez MD, Hung Santiago MD, Latisha Blue MD, Jl Tejeda, DO, Jhony Fontenot, DO, Megha Garcia MD,  David Ford, DO, Didier Gracia MD, January Liu MD, Melinda Uriarte MD, Von Lundy MD, Dilshad Murphy MD, Chandrika Garcia MD, Jen Cooper MD, Maria Antonia Wong, [facility], [facility], CNP, Krysta Black, CNP, Laurence Santos, CNS, Simón Wheeler, CNP, Adelia Alpers, CNP, Chema Yap, CNP, Scarlett Delgado, CNP, Surekha Suarez, CNP, Daniel Torrez PA-C, Stephanie Howe, [facility], Jj Brown, CNP, Pola Reeves, CNP, Sunshine Castro, CNP, Julio Mckeon, CNP, Kenn Foley, CNP, Ulysses Barker, [facility], Angel Obregon, [facility]    Discharge Summary     Patient ID: Rubens Martinez  :  1970   MRN: [id number]     ACCOUNT:  [de-identified]   Patient's PCP: Mary Damon MD  Admit Date: 2021   Discharge Date: 2021     Length of Stay: 2  Code Status:  Full Code  Admitting Physician: Shruti Lopez DO  Discharge Physician: Shruti Lopez DO     Active Discharge Diagnoses:     Hospital Problem Lists:  Principal Problem:    Partial small bowel obstruction ([facility]  Active Problems:    Pain of upper abdomen    Left ureteral stone-obstructing without hydronephrosis    Diabetes mellitus type 2 in [facility])    Essential hypertension    History of hernia surgery-bilateral    HATTEI (acute kidney injury) ([facility]    Uncontrolled type 2 diabetes mellitus with hyperglycemia ([facility]  Resolved Problems:    * No resolved hospital problems.  *      Admission Condition:  stable     Discharged Condition: stable    Hospital Stay:     Hospital Course:  Rubens Martinez is a 45 y.o. male who was admitted for the management of partial small bowel obstruction. Patient was admitted and treated supportively. An NG tube was placed for decompression, IVF and bowel regimen. Patient did improve and prior to discharge was able to have regular bowel movements. Patient was able to ambulate the halls. Abdominal pain resolved. Patient tolerating his diet. General surgery signed off. Currently, patient medically stable for discharge. He will follow with his primary care physician within 1 week of discharge for posthospitalization follow-up. Patient was also found to have a left ureteral obstructing stone without hydronephrosis. Pt was treated medically, Urology did not recommend any surgical intervention. He was started on Flomax successfully. Currently, patient medically stable for discharge. Needs follow up with PCP within one week of discharge . Significant therapeutic interventions: see above    Significant Diagnostic Studies:   Labs / Micro:  CBC:   Lab Results   Component Value Date    WBC 4.5 11/13/2021    RBC 4.04 11/13/2021    HGB 12.3 11/13/2021    HCT 37.0 11/13/2021    MCV 91.6 11/13/2021    MCH 30.4 11/13/2021    MCHC 33.2 11/13/2021    RDW 12.3 11/13/2021     11/13/2021     BMP:    Lab Results   Component Value Date    GLUCOSE 157 11/13/2021     11/13/2021    K 4.0 11/13/2021     11/13/2021    CO2 20 11/13/2021    ANIONGAP 12 11/13/2021    BUN 14 11/13/2021    CREATININE 0.81 11/13/2021    BUNCRER NOT REPORTED 11/13/2021    CALCIUM 8.7 11/13/2021    LABGLOM >60 11/13/2021    GFRAA >60 11/13/2021    GFR      11/13/2021    GFR NOT REPORTED 11/13/2021        Radiology:  XR ABDOMEN (KUB) (SINGLE AP VIEW)    Result Date: 11/12/2021  No evidence of bowel obstruction. The enteric tube side port is approximately 4 cm below the GE junction. Advancement is recommended. The tip is in the proximal gastric body.      XR CHEST PORTABLE    Result Date: 11/10/2021  Negative chest.     CTA CHEST ABDOMEN PELVIS W CONTRAST    Result Date: as: COZAAR     Mobic 7.5 MG tablet  Generic drug: meloxicam     naproxen 500 MG tablet  Commonly known as: Naprosyn           Where to Get Your Medications      Information about where to get these medications is not yet available    Ask your nurse or doctor about these medications  · tamsulosin 0.4 MG capsule         No discharge procedures on file. Time Spent on discharge is  36 mins in patient examination, evaluation, counseling as well as medication reconciliation, prescriptions for required medications, discharge plan and follow up. Electronically signed by   Saad Carty DO  11/13/2021  2:26 PM      Thank you Dr. John Loredo MD for the opportunity to be involved in this patient's care.

## 2021-11-13 NOTE — PROGRESS NOTES
[facility]  Office: [address], DO, Jan Landaverdene, DO, Donna Ciera, DO, Kelsy Franklin, DO, Bonnie Paz MD, Tera Pickard MD, Emeka Kelly MD, Renetta Medina MD, Merlin Sol MD, Purvi Wise MD, Glo Moritz, MD, Douglas Cornell, DO, Christo Guevara, DO, Anant Castillo MD,  Stephania Auguste, DO, Endy Argueta MD, Randee Porras MD, Kaitlynn Jacobson MD, Collin Hope MD, Rob Valdez MD, Florencio Ervin MD, Marge Moreno MD, Kathy [facility], [facility], CNP, Tameka Patel, CNP, Rosas Winter, CNS, Tyler Vivar, Makenna Bowers, CNP, Shruti Thornton, CNP, Tricia Gee, CNP, Mini Torres, CNP, Hannah Ulrich PA-C, Josh Glynn, [facility], Matteo Alonso, CNP, Karen Yap, CNP, Colonel Syed, CNP, [place], CNP, Michele Davila, CNP, Hector Woods, CNP, Twyla Li, [address] [place] [address]    Progress Note    11/13/2021    9:02 AM    Name:   Shashi Bragg  MRN:     [id number]     [place]:      [de-identified]   Room:   [address] [place] [address] Day:  2  Admit Date:  11/11/2021  4:14 PM    PCP:   Benton Whitten MD  Code Status:  Full Code    Subjective:     C/C: Abdominal pain  Interval History Status: improved. Patient seen and examined at bedside today. Patient resting comfortably. Able to get up and ambulate halls. Denies any abdominal pain. Having multiple bowel movements. Denies any complaints      Review of Systems:     Constitutional:  negative for chills, fevers, sweats  Respiratory:  negative for cough, dyspnea on exertion, shortness of breath, wheezing  Cardiovascular:  negative for chest pain, chest pressure/discomfort, lower extremity edema, palpitations  Gastrointestinal:  negative for abdominal pain, constipation, diarrhea, nausea, vomiting  Neurological:  negative for dizziness, headache    Medications:      Allergies:  No Known Allergies    Current Meds:   Scheduled Meds:    insulin glargine 25 Units SubCUTAneous Nightly    insulin lispro  0-12 Units SubCUTAneous TID     insulin lispro  0-6 Units SubCUTAneous Nightly    ciprofloxacin  400 mg IntraVENous Q12H    sodium chloride flush  5-40 mL IntraVENous 2 times per day    tamsulosin  0.4 mg Oral Daily    heparin (porcine)  5,000 Units SubCUTAneous 3 times per day     Continuous Infusions:    dextrose      sodium chloride 150 mL/hr at 21 0505    sodium chloride       PRN Meds: dextrose, dextrose, glucagon (rDNA), glucose, sodium chloride, acetaminophen, HYDROcodone 5 mg - acetaminophen **OR** HYDROcodone 5 mg - acetaminophen, ondansetron **OR** ondansetron, polyethylene glycol, sodium chloride flush, hydrALAZINE, morphine **OR** morphine, bisacodyl    Data:     Past Medical History:   has a past medical history of Diabetes mellitus ([address] and Hypertension. Social History:   reports that he has never smoked. He has never used smokeless tobacco. He reports previous alcohol use. Family History: No family history on file. Vitals:  BP (!) 148/95   Pulse 91   Temp 97.9 °F (36.6 °C)   Resp 18   SpO2 95%   Temp (24hrs), Av.5 °F (36.9 °C), Min:97.8 °F (36.6 °C), Max:99.5 °F (37.5 °C)    Recent Labs     21  1134 21  1627 21  2038 21  0754   POCGLU 210* 167* 213* 146*       I/O (24Hr):   No intake or output data in the 24 hours ending 21 0902    Labs:  Hematology:  Recent Labs     11/10/21  1715 21  0519 21  0755   WBC 9.4 5.7 4.5   RBC 5.45 4.06* 4.04*   HGB 16.3 12.5* 12.3*   HCT 47.8 38.6* 37.0*   MCV 87.8 95.1 91.6   MCH 30.0 30.8 30.4   MCHC 34.2 32.4 33.2   RDW 13.4 12.6 12.3    220 195   MPV NOT REPORTED 10.4 10.0   INR 1.0  --   --      Chemistry:  Recent Labs     11/10/21  1715 11/10/21  1811 21  0519 21  0755     --  137 135   K 3.3*  --  4.0 4.0   CL 95*  --  105 103   CO2 19*  --  20 20   GLUCOSE 137*  --  181* 157*   BUN 27*  --  24* 14   CREATININE 1.57*  --  0.98 0.81   MG 2.5  --  2.1  --    ANIONGAP 21*  --  12 12   LABGLOM 47*  --  >60 >60   GFRAA 57*  --  >60 >60   CALCIUM 10.8*  --  7.8* 8.7   PROBNP 73  --   --   --    TROPHS 20 16  --   --      Recent Labs     11/10/21  1715 11/10/21  1811 11/11/21  1645 11/11/21  1834 11/11/21  2107 11/12/21  0437 11/12/21  0739 11/12/21  1134 11/12/21  1627 11/12/21  2038 11/13/21  0754   PROT  --  7.3  --   --   --   --   --   --   --   --   --    LABALBU  --  4.2  --   --   --   --   --   --   --   --   --    LABA1C  --   --   --  8.5*  --   --   --   --   --   --   --    AST  --  23  --   --   --   --   --   --   --   --   --    ALT  --  35  --   --   --   --   --   --   --   --   --    ALKPHOS  --  53  --   --   --   --   --   --   --   --   --    BILITOT  --  0.44  --   --   --   --   --   --   --   --   --    BILIDIR  --  <0.08  --   --   --   --   --   --   --   --   --    LIPASE 69*  --   --   --   --   --   --   --   --   --   --    POCGLU  --   --    < >  --    < > 122* 153* 210* 167* 213* 146*    < > = values in this interval not displayed. ABG:No results found for: POCPH, PHART, PH, POCPCO2, ULR2ZMM, PCO2, POCPO2, PO2ART, PO2, POCHCO3, LYH4XVL, HCO3, NBEA, PBEA, BEART, BE, THGBART, THB, GDW7KCW, RGXZ1AOE, U1UFUEIM, O2SAT, FIO2  Lab Results   Component Value Date/Time    SPECIAL NOT REPORTED 11/12/2021 12:54 AM     Lab Results   Component Value Date/Time    CULTURE CULTURE IN PROGRESS 11/12/2021 12:54 AM       Radiology:  XR ABDOMEN (KUB) (SINGLE AP VIEW)    Result Date: 11/12/2021  No evidence of bowel obstruction. The enteric tube side port is approximately 4 cm below the GE junction. Advancement is recommended. The tip is in the proximal gastric body. XR CHEST PORTABLE    Result Date: 11/10/2021  Negative chest.     CTA CHEST ABDOMEN PELVIS W CONTRAST    Result Date: 11/10/2021  5 mm obstructing stone proximal left ureter without hydronephrosis. No aortic dissection.  Diffuse ileus small bowel with large amount of fluid in the stomach. Nonobstructive ileus considered most likely, but partial obstruction not completely excluded. Consider small bowel follow-through if clinically indicated. Physical Examination:        General appearance:  alert, cooperative and no distress  Mental Status:  oriented to person, place and time and normal affect  Lungs:  clear to auscultation bilaterally, normal effort  Heart:  regular rate and rhythm, no murmur  Abdomen:  soft, nontender, nondistended, normal bowel sounds, no masses, hepatomegaly, splenomegaly  Extremities:  no edema, redness, tenderness in the calves  Skin:  no gross lesions, rashes, induration    Assessment:        Hospital Problems           Last Modified POA    * (Principal) Partial small bowel obstruction ([address] 11/12/2021 Yes    Pain of upper abdomen 11/12/2021 Yes    Left ureteral stone-obstructing without hydronephrosis 11/11/2021 Yes    Diabetes mellitus type 2 in obese ([address] 11/11/2021 Yes    Essential hypertension 11/11/2021 Yes    History of hernia surgery-bilateral 11/11/2021 Yes    HATTIE (acute kidney injury) ([address] 11/11/2021 Yes    Uncontrolled type 2 diabetes mellitus with hyperglycemia ([address] 11/12/2021 Yes          Plan:        1. Abdominal pain with partial SBO: Resolved. NG tube removed. Regular bowel movements. Surgery signed off. Will discharge   2. left ureteral obstructing stone without hydronephrosis: Urology consulted, plan for medical treatment. Continue flomax. No evidence of UTI, will stop Ciprofloxacin. Plan to follow-up outpatient with urology  3. Diabetes mellitus type II with hyperglycemia  4. Acute Kidney injury: resolved.      Alayna Ramos DO  11/13/2021  9:02 AM

## 2021-11-13 NOTE — PROGRESS NOTES
[facility]  Office: [address], DO, Skylerinna Flynn, DO, Naman Morris, DO, Abby Mariano Blood, DO, Irene Jean MD, Mariana Wilson MD, Jhonatan García MD, Tana Wilkes MD, Jean-Paul Hdz MD, Demetri Ritchie MD, Noman Rosario MD, Minh Mchugh, DO, Jose De Jesus Corcoran, DO, Maria Fernanda Benavidez MD,  Fina Christine DO, Regulo Whitman MD, Alejandro Duque MD, Grayson Franklin MD, Vandana Jon MD, Samm Armstrong MD, Praveen Montoya MD, Christine Rosas MD, Jeovanny Foster, [facility], [facility], CNP, Kenna Lamb, CNP, Ian Echeverria, CNS, Ailene Lesches, CNP, Bev Pelaez, CNP, Veda Jones, CNP, Judie Malik, CNP, Karla Vogel, CNP, Manoj Salazar PA-C, Elza Magallon, [facility], Sebastien Magdaleno, CNP, Chastity Phelps, CNP, Shiv Carnes, CNP, Symone Ojeda, CNP, Mau Garcia, CNP, Lisa Márquez, [facility], Andi Jeffers, [address] [place] [address]    Progress Note    11/12/2021    8:06 PM    Name:   Emilee Li  MRN:     [id number]     [place]:      [de-identified]   Room:   [address] [place] [address] Day:  1  Admit Date:  11/11/2021  4:14 PM    PCP:   Bacilio Combs MD  Code Status:  Full Code    Subjective:     C/C:   Abdominal pain    Interval History Status:   Improved  Reporting flatus  Has had several stools  Appetite improved  Pain controlled    Data Base Updates:  WBC5.7k/uL RBC4.06 Low m/uL Kirvomnxzm75.5 Low      Blood sugars still labile  Cgmhpyl079 High    Pkgnshw519 High mg/dL     BUN24 High mg/dL   CREATININE0.98     KUB:Impression:  No evidence of bowel obstruction. The enteric tube side port is approximately 4 cm below the GE junction. Advancement is recommended. The tip is in the proximal gastric body.        Brief History:     As documented in the medical record:  Perry Reeves is a 45 y.o. male who presents to the emergency room via private vehicle, patient complaining of abdominal pain indicating epigastric rating down to the suprapubic, 10 of 10, causing him to be hard to breathe and short of breath, some associated nausea vomiting. Onset day prior. Patient denies any diarrhea, denies trauma falls, denies a history of kidney stones, denies any dysuria or hematuria, [place] any history of aneurysms. Nothing is helped his pain. Patient states he was not having bowel movement for the last 4 days, started having abdominal pain more in mid epigastrium and periumbilical region for the last 1 day. He was vomiting when he came to [facility] ER, on CT abdomen he was found to have 5 mm obstructing stone in the proximal left ureter without hydronephrosis and diffuse ileus of small bowel with large amount of fluid in the stomach suggestive of partial obstruction. NG tube was placed over there and patient states to large bottles of fluid was drained out. He felt comfortable in the epigastrium after that. Today morning he started passing flatus but did not have bowel movement so far. He is denying nausea or vomiting now and he still has NG tube in place. Patient had bilateral hernia surgery in the past with mesh placement. He denies any flank pains and denies any kidney stones in the past.\"    The intitial plan included: \"Keep n.p.o. Continue IV fluids and IV Cipro which was already started  NGT to KATT BENSON per surgery plan  Hold all oral home medicines  Low intensity insulin sliding scale and check A1c  Hydralazine as needed for blood pressure control  Pain control with small dose of morphine as needed  Surgery urology have already been consulted  Monitor labs in morning\"    11/12:  A patient is responding well to conservative treatment  NG tube has been clamped and removed  Diet resumed  The patient is having flatus and stools  Activity encouraged       Past Medical History:   has a past medical history of Diabetes mellitus ([address] and Hypertension. Social History:   reports that he has never smoked. He has never used smokeless tobacco. He reports previous alcohol use.      Family History: No family history on file. Review of Systems:     Review of Systems   Constitutional: Positive for appetite change (Improving). Respiratory: Negative for cough and shortness of breath. Cardiovascular: Negative for chest pain and palpitations. Gastrointestinal: Positive for diarrhea (Stools have been loose). Negative for abdominal pain, nausea and vomiting. Genitourinary: Positive for dysuria (Still experiencing mild dysuria). Negative for flank pain and hematuria. Physical Examination:        Vitals  BP (!) 148/92   Pulse 95   Temp 98.6 °F (37 °C) (Oral)   Resp 19   SpO2 95%   Temp (24hrs), Av.6 °F (37 °C), Min:97.7 °F (36.5 °C), Max:99.5 °F (37.5 °C)    Recent Labs     21  0437 21  0739 21  1134 21  1627   POCGLU 122* 153* 210* 167*       Physical Exam  Vitals reviewed. Constitutional:       General: He is not in acute distress. Appearance: He is not diaphoretic. HENT:      Head: Normocephalic. Nose: Nose normal.   Eyes:      General: No scleral icterus. Conjunctiva/sclera: Conjunctivae normal.   Neck:      Trachea: No tracheal deviation. Cardiovascular:      Rate and Rhythm: Normal rate and regular rhythm. Pulmonary:      Effort: Pulmonary effort is normal. No respiratory distress. Breath sounds: Normal breath sounds. No wheezing or rales. Chest:      Chest wall: No tenderness. Abdominal:      General: There is no distension. Palpations: Abdomen is soft. Tenderness: There is no abdominal tenderness. There is no guarding. Musculoskeletal:         General: No tenderness. Cervical back: Neck supple. Skin:     General: Skin is warm and dry. Medications:      Allergies:  No Known Allergies    Current Meds:   Scheduled Meds:    insulin glargine  25 Units SubCUTAneous Nightly    [START ON 2021] insulin lispro  0-12 Units SubCUTAneous TID WC    insulin lispro  0-6 Units SubCUTAneous Nightly    ciprofloxacin  400 mg IntraVENous Q12H    sodium chloride flush  5-40 mL IntraVENous 2 times per day    tamsulosin  0.4 mg Oral Daily    heparin (porcine)  5,000 Units SubCUTAneous 3 times per day     Continuous Infusions:    dextrose      sodium chloride 150 mL/hr at 11/12/21 0347    sodium chloride       PRN Meds: dextrose, dextrose, glucagon (rDNA), glucose, sodium chloride, acetaminophen, HYDROcodone 5 mg - acetaminophen **OR** HYDROcodone 5 mg - acetaminophen, ondansetron **OR** ondansetron, polyethylene glycol, sodium chloride flush, hydrALAZINE, morphine **OR** morphine, bisacodyl    Data:     I/O (24Hr):     Intake/Output Summary (Last 24 hours) at 11/12/2021 2006  Last data filed at 11/12/2021 0033  Gross per 24 hour   Intake --   Output 404 ml   Net -404 ml       Labs:  Hematology:  Recent Labs     11/10/21  1715 11/12/21  0519   WBC 9.4 5.7   RBC 5.45 4.06*   HGB 16.3 12.5*   HCT 47.8 38.6*   MCV 87.8 95.1   MCH 30.0 30.8   MCHC 34.2 32.4   RDW 13.4 12.6    220   MPV NOT REPORTED 10.4   INR 1.0  --      Chemistry:  Recent Labs     11/10/21  1715 11/10/21  1811 11/12/21  0519     --  137   K 3.3*  --  4.0   CL 95*  --  105   CO2 19*  --  20   GLUCOSE 137*  --  181*   BUN 27*  --  24*   CREATININE 1.57*  --  0.98   MG 2.5  --  2.1   ANIONGAP 21*  --  12   LABGLOM 47*  --  >60   GFRAA 57*  --  >60   CALCIUM 10.8*  --  7.8*   PROBNP 73  --   --    TROPHS 20 16  --      Recent Labs     11/10/21  1715 11/10/21  1811 11/11/21  1645 11/11/21  1834 11/11/21  2107 11/12/21  0033 11/12/21  0437 11/12/21  0739 11/12/21  1134 11/12/21  1627   PROT  --  7.3  --   --   --   --   --   --   --   --    LABALBU  --  4.2  --   --   --   --   --   --   --   --    LABA1C  --   --   --  8.5*  --   --   --   --   --   --    AST  --  23  --   --   --   --   --   --   --   --    ALT  --  35  --   --   --   --   --   --   --   --    ALKPHOS  --  53  --   --   --   --   --   --   --   --    BILITOT  --  0.44  -- --   --   --   --   --   --   --    BILIDIR  --  <0.08  --   --   --   --   --   --   --   --    LIPASE 69*  --   --   --   --   --   --   --   --   --    POCGLU  --   --    < >  --  166* 130* 122* 153* 210* 167*    < > = values in this interval not displayed. ABG:No results found for: POCPH, PHART, PH, POCPCO2, ZCJ5ISM, PCO2, POCPO2, PO2ART, PO2, POCHCO3, FVW9BTA, HCO3, NBEA, PBEA, BEART, BE, THGBART, THB, HMB7ZIW, FXXM1NUV, I8QLDZMN, O2SAT, FIO2  No results found for: SPECIAL  No results found for: CULTURE    Radiology:  XR ABDOMEN (KUB) (SINGLE AP VIEW)    Result Date: 11/12/2021  No evidence of bowel obstruction. The enteric tube side port is approximately 4 cm below the GE junction. Advancement is recommended. The tip is in the proximal gastric body. XR CHEST PORTABLE    Result Date: 11/10/2021  Negative chest.     CTA CHEST ABDOMEN PELVIS W CONTRAST    Result Date: 11/10/2021  5 mm obstructing stone proximal left ureter without hydronephrosis. No aortic dissection. Diffuse ileus small bowel with large amount of fluid in the stomach. Nonobstructive ileus considered most likely, but partial obstruction not completely excluded. Consider small bowel follow-through if clinically indicated.        Assessment:        Primary Problem  Partial small bowel obstruction [facility])    Active Hospital Problems    Diagnosis Date Noted    Uncontrolled type 2 diabetes mellitus with hyperglycemia ([address] [E11.65] 11/12/2021    Pain of upper abdomen [R10.10] 11/11/2021    Partial small bowel obstruction ([address] [K56.600] 11/11/2021    Left ureteral stone-obstructing without hydronephrosis [N20.1] 11/11/2021    Diabetes mellitus type 2 in obese ([address] [E11.69, E66.9] 11/11/2021    Essential hypertension [I10] 11/11/2021    History of hernia surgery-bilateral [Z98.890, Z87.19] 11/11/2021    HATTIE (acute kidney injury) ([facility] [N17.9] 11/11/2021         Plan:        Surgical evaluation  Urology evaluation  HATTIE: Check bun and creatinine   Pain management  DC NG tube  Diet as tolerated  Activity as tolerated  (The patient is ambulating in the cameron!)  Resume Lantus at 25 units daily  Sliding scale titrated to AC/HS  Glycemic contol - monitor and control blood sugars  Blood Pressure - Monitor and control  Cipro: Cultures pending  DVT prophylaxis  Discharge planning  Will discharge 11/13 if arrangements complete and ok with other services.   Follow-up with PCP in one week, Jan Calderon MD  Notify PCP of discharge     IP CONSULT TO UROLOGY  IP CONSULT TO  [facility]  11/12/2021  8:06 PM

## 2021-11-15 ENCOUNTER — TELEPHONE (OUTPATIENT)
Dept: UROLOGY | Age: 51
End: 2021-11-15

## 2021-11-15 ENCOUNTER — CARE COORDINATION (OUTPATIENT)
Dept: CASE MANAGEMENT | Age: 51
End: 2021-11-15

## 2021-11-15 NOTE — TELEPHONE ENCOUNTER
Called patient back from a voicemail received. Advised we do not accept insurance and provided information to contact private office. Pt stated his understanding.

## 2021-11-15 NOTE — TELEPHONE ENCOUNTER
----- Message from Demar Fall MD sent at 11/12/2021  4:36 PM EST -----  This pt will need fu on his kidney stone! 3 weeks office visit with [facility]   Thank you!

## 2021-11-15 NOTE — CARE COORDINATION
[facility] Transitions Initial Follow Up Call    Call within 2 business days of discharge: Yes    Patient: Consuella Claude Patient : 1970   MRN: <[id number]>  Reason for Admission: Partial small bowel obstruction    LEFT UTERAL STONE     Discharge Date: 21 RARS: Readmission Risk Score: 5.5 ( )      Last Discharge [facility]       Complaint Diagnosis Description Type Department Provider    21   Admission (Discharged) [address],            Spoke with: 24 HOUR INITIAL. NO answer. Left HIPPA compliant message to return call to this [address]: [address] [place] [address]    Non-face-to-face services provided:  Obtained and reviewed discharge summary and/or continuity of care documents    Care Transitions 24 Hour Call    Care Transitions Interventions         Follow Up  No future appointments.     NORA Rai LPN Care Transitions Nurse   [phone]

## 2021-11-16 ENCOUNTER — CARE COORDINATION (OUTPATIENT)
Dept: CASE MANAGEMENT | Age: 51
End: 2021-11-16

## 2021-11-16 NOTE — CARE COORDINATION
[facility] 45 Transitions Initial Follow Up Call    Call within 2 business days of discharge: Yes    Patient: Minnie John Patient : 1970   MRN: <[id number]>  Reason for Admission: Partial small bowel obstruction  KIDNEY STONE  Discharge Date: 21 RARS: Readmission Risk Score: 5.5 ( )      Last Discharge [facility]       Complaint Diagnosis Description Type Department Provider    21   Admission (Discharged) [facility],            Spoke with: [address] Second attempt. Spoke to [facility]. [facility] states he is feeling fine. He has no abdominal pain. Appetite is good. Bowel and bladder WNL. Pt. States he called to f/u with urology, but they do not take his insurance. He is calling his PCP today to schedule f/u appt. Medication reconciliation completed. Non [facility] PCP. Pt. Started new med Flomax. [facility] has not had the Covid Vaccine. Pt has no new issues or concerns. Final call. Transitions of Care Initial Call    Was this an external facility discharge? No Discharge Facility: n/a    Challenges to be reviewed by the provider   Additional needs identified to be addressed with provider: No  none             Method of communication with provider : none      Advance Care Planning:   Does patient have an Advance Directive: reviewed and current. Was this a readmission? No  Patient stated reason for admission: abdominal pain bowel obstruction  Patients top risk factors for readmission: medical condition-bowel obstruction  kidney stone    Care Transition Nurse (CTN) contacted the patient by telephone to perform post hospital discharge assessment. Verified name and  with patient as identifiers. Provided introduction to self, and explanation of the CTN role. CTN reviewed discharge instructions, medical action plan and red flags with patient who verbalized understanding.  Patient given an opportunity to ask questions and does not have any further questions or concerns at this time. Were discharge instructions available to patient? Yes. Reviewed appropriate site of care based on symptoms and resources available to patient including: PCP, Specialist and When to call 911. The patient agrees to contact the PCP office for questions related to their healthcare. Medication reconciliation was performed with patient, who verbalizes understanding of administration of home medications. Advised obtaining a 90-day supply of all daily and as-needed medications. Covid Risk Education     Educated patient about risk for severe COVID-19 due to risk factors according to CDC guidelines. LPN CC reviewed discharge instructions, medical action plan and red flag symptoms with the patient who verbalized understanding. Discussed COVID vaccination status: Yes. Education provided on COVID-19 vaccination as appropriate. Discussed exposure protocols and quarantine with CDC Guidelines. Patient was given an opportunity to verbalize any questions and concerns and agrees to contact LPN CC or health care provider for questions related to their healthcare. Reviewed and educated patient on any new and changed medications related to discharge diagnosis. Was patient discharged with a pulse oximeter? No Discussed and confirmed pulse oximeter discharge instructions and when to notify provider or seek emergency care. LPN CC provided contact information. No further follow-up call indicated based on severity of symptoms and risk factors. Plan for next call: n/a        Facility: [unfilled]    Non-face-to-face services provided:  Obtained and reviewed discharge summary and/or continuity of care documents    Care Transitions 24 Hour Call    Do you have any ongoing symptoms?: No  Do you have a copy of your discharge instructions?: Yes  Care Transitions Interventions         Follow Up  No future appointments.    Alcides Padgett LPN Care Transitions Nurse   [phone]    Johnson Prader, LPN

## 2022-03-17 ENCOUNTER — HOSPITAL ENCOUNTER (EMERGENCY)
Age: 52
Discharge: HOME | End: 2022-03-17
Attending: FAMILY MEDICINE
Payer: MEDICARE

## 2022-03-17 VITALS
SYSTOLIC BLOOD PRESSURE: 149 MMHG | HEIGHT: 66 IN | TEMPERATURE: 97.7 F | HEART RATE: 104 BPM | RESPIRATION RATE: 16 BRPM | WEIGHT: 187 LBS | DIASTOLIC BLOOD PRESSURE: 96 MMHG | BODY MASS INDEX: 30.05 KG/M2 | OXYGEN SATURATION: 95 %

## 2022-03-17 DIAGNOSIS — N48.33 PRIAPISM, DRUG-INDUCED: Primary | ICD-10-CM

## 2022-03-17 PROCEDURE — 99284 EMERGENCY DEPT VISIT MOD MDM: CPT

## 2022-03-17 RX ORDER — LIDOCAINE HYDROCHLORIDE 10 MG/ML
5 INJECTION, SOLUTION INFILTRATION; PERINEURAL ONCE
Status: DISCONTINUED | OUTPATIENT
Start: 2022-03-17 | End: 2022-03-17 | Stop reason: HOSPADM

## 2022-03-18 NOTE — ED PROVIDER NOTES
[facility]  eMERGENCY dEPARTMENT eNCOUnter          [facility]       Chief Complaint   Patient presents with    Personal Problem     pt reports 5 hour erection after dr Toni Aguirre injection of new medication of 100mg. pain rated 5/10. Nurses Notes reviewed and I agree except as noted in the HPI. HISTORY OF PRESENT ILLNESS    Madelyn Alejandro is a 45 y.o. male who presents to the emergency room via private vehicle, patient gave himself an injection to get an erection, has had a consistent erection for the past 5 hours, states came to the emergency room for evaluation. Patient states he took the dose that was recommended by his urologist, 100 units of Bipep, shows a bottle that had labeled Papverine and Phentolamine. Patient denies any other erectile dysfunction drugs. Denies any dysuria or hematuria. Denies trauma. REVIEW OF SYSTEMS     Review of Systems   All other systems reviewed and are negative. PAST MEDICAL HISTORY    has a past medical history of Diabetes mellitus ([address] and Hypertension. SURGICAL HISTORY      has a past surgical history that includes hernia repair. CURRENT MEDICATIONS       Discharge Medication List as of 3/17/2022  5:36 PM      CONTINUE these medications which have NOT CHANGED    Details   tamsulosin (FLOMAX) 0.4 MG capsule Take 1 capsule by mouth daily, Disp-30 capsule, R-0Normal      insulin lispro (HUMALOG) 100 UNIT/ML injection vial Inject into the skin 3 times daily (before meals) Breakfast is 10+sliding, lunch 15+sliding, dinner 20+slidingHistorical Med      atorvastatin (LIPITOR) 10 MG tablet Take 10 mg by mouth dailyHistorical Med      insulin glargine (LANTUS) 100 UNIT/ML injection vial Inject 50 Units into the skin nightly Historical Med             ALLERGIES     has No Known Allergies. FAMILY HISTORY     has no family status information on file. family history is not on file.     SOCIAL HISTORY      reports that he has never smoked. He has never used smokeless tobacco. He reports previous alcohol use. PHYSICAL EXAM     INITIAL VITALS:  height is 5' 6\" (1.676 m) and weight is 187 lb (84.8 kg). His temperature is 97.7 °F (36.5 °C). His blood pressure is 149/96 (abnormal) and his pulse is 104. His respiration is 16 and oxygen saturation is 95%. Physical Exam   Constitutional: Patient is oriented to person, place, and time. Patient appears well-developed and well-nourished. Patient is active and cooperative. Neck: Full passive range of motion without pain and phonation normal.   Cardiovascular:  Normal rate, regular rhythm and intact distal pulses. Pulses: Right radial pulse  2+   Pulmonary/Chest: Effort normal. No tachypnea and no bradypnea. Abdominal: BMI 30.1, . Patient without distension   : Normal-appearing penis and scrotum with erect penis, no discoloration, no gross trauma or deformity  Musculoskeletal:   Negative acute trauma or deformity,  apparent full range of motion and normal strength all extremities appropriate to age. Neurological: Patient is alert and oriented to person, place, and time. patient displays no tremor. Patient displays no seizure activity. Normal observed gait. Skin: Skin is warm and dry. Patient is not diaphoretic. Psychiatric: Patient has a normal mood and affect.  Patient speech is normal and behavior is normal. Cognition and memory are normal.    DIFFERENTIAL DIAGNOSIS:   Priapism    DIAGNOSTIC RESULTS           RADIOLOGY: non-plain film images(s) such as CT, Ultrasound and MRI are read by the radiologist.  No orders to display       LABS:   Labs Reviewed - No data to display    EMERGENCY DEPARTMENT COURSE:   Vitals:    Vitals:    03/17/22 1659   BP: (!) 149/96   Pulse: 104   Resp: 16   Temp: 97.7 °F (36.5 °C)   SpO2: 95%   Weight: 187 lb (84.8 kg)   Height: 5' 6\" (1.676 m)     Patient history and physical exam taken at bedside, noting patient had utilized and injected substance to get an erection, and noting time I am a 5 hours, I did have some concern for priapism, given the time a day I did contact Dr. Mervin English to see if he might still be in office and/or finishing procedures, and was able to catch him just prior to him leaving. Dr. Hans Pratt was kind enough to come to the emergency room and also evaluate patient, although patient was requesting if a female urologist would be available as he would be more comfortable with this. Discussed drained the blood from the cavernosum of the penis in order to decrease the erection, the patient is now saying that he feels his erection is now getting softer and slowly going away, after further discussion patient is opted not to have the erection drained, and will watch her closely. He is advised to follow-up closely with his established urologist, and avoid taking this medication again until he fully discusses this with his urologist.  Patient acknowledges and agrees. FINAL IMPRESSION      1.  Priapism, drug-induced Improving         DISPOSITION/PLAN   D/c    PATIENT REFERRED TO:  Follow up with your established Urologist in [place], [place]    Call       Kathy Salgado MD  [address]  [phone]    Call   As needed    [facility]  [address] [facility] [address] [phone]    As needed, If symptoms worsen      DISCHARGE MEDICATIONS:  Discharge Medication List as of 3/17/2022  5:36 PM              Summation      Patient Course:  D/c    ED Medications administered this visit:  Medications - No data to display    New Prescriptions from this visit:    Discharge Medication List as of 3/17/2022  5:36 PM          Follow-up:  Follow up with your established Urologist in [place], [place]    Call       Kathy Salgado MD  [address]  [phone]    Call   As needed    [facility] ED  [address] [facility]     As needed, If symptoms worsen        Final Impression:   1.  Priapism, drug-induced Improving              (Please note that portions of this note were completed with a voice recognition program.  Efforts were made to edit the dictations but occasionally words are mis-transcribed.)    MD Rosa Isela Rodriguez MD  03/18/22 2373

## 2022-03-23 NOTE — ED TRIAGE NOTES
Medicare Preventive Visit Patient Instructions  Thank you for completing your Welcome to Medicare Visit or Medicare Annual Wellness Visit today  Your next wellness visit will be due in one year (3/24/2023)  The screening/preventive services that you may require over the next 5-10 years are detailed below  Some tests may not apply to you based off risk factors and/or age  Screening tests ordered at today's visit but not completed yet may show as past due  Also, please note that scanned in results may not display below  Preventive Screenings:  Service Recommendations Previous Testing/Comments   Colorectal Cancer Screening  · Colonoscopy    · Fecal Occult Blood Test (FOBT)/Fecal Immunochemical Test (FIT)  · Fecal DNA/Cologuard Test  · Flexible Sigmoidoscopy Age: 54-65 years old   Colonoscopy: every 10 years (May be performed more frequently if at higher risk)  OR  FOBT/FIT: every 1 year  OR  Cologuard: every 3 years  OR  Sigmoidoscopy: every 5 years  Screening may be recommended earlier than age 48 if at higher risk for colorectal cancer  Also, an individualized decision between you and your healthcare provider will decide whether screening between the ages of 74-80 would be appropriate   Colonoscopy: 04/21/2017  FOBT/FIT: Not on file  Cologuard: Not on file  Sigmoidoscopy: Not on file    Screening Current     Prostate Cancer Screening Individualized decision between patient and health care provider in men between ages of 53-78   Medicare will cover every 12 months beginning on the day after your 50th birthday PSA: 4 5 ng/mL     Screening Current     Hepatitis C Screening Once for adults born between 1945 and 1965  More frequently in patients at high risk for Hepatitis C Hep C Antibody: 08/07/2017    Screening Current   Diabetes Screening 1-2 times per year if you're at risk for diabetes or have pre-diabetes Fasting glucose: No results in last 5 years   A1C: No results in last 5 years    Screening Current Pt medication list verified by patient and correct. Cholesterol Screening Once every 5 years if you don't have a lipid disorder  May order more often based on risk factors  Lipid panel: 03/18/2022    Screening Not Indicated  History Lipid Disorder      Other Preventive Screenings Covered by Medicare:  1  Abdominal Aortic Aneurysm (AAA) Screening: covered once if your at risk  You're considered to be at risk if you have a family history of AAA or a male between the age of 73-68 who smoking at least 100 cigarettes in your lifetime  2  Lung Cancer Screening: covers low dose CT scan once per year if you meet all of the following conditions: (1) Age 50-69; (2) No signs or symptoms of lung cancer; (3) Current smoker or have quit smoking within the last 15 years; (4) You have a tobacco smoking history of at least 30 pack years (packs per day x number of years you smoked); (5) You get a written order from a healthcare provider  3  Glaucoma Screening: covered annually if you're considered high risk: (1) You have diabetes OR (2) Family history of glaucoma OR (3)  aged 48 and older OR (3)  American aged 72 and older  3  Osteoporosis Screening: covered every 2 years if you meet one of the following conditions: (1) Have a vertebral abnormality; (2) On glucocorticoid therapy for more than 3 months; (3) Have primary hyperparathyroidism; (4) On osteoporosis medications and need to assess response to drug therapy  5  HIV Screening: covered annually if you're between the age of 12-76  Also covered annually if you are younger than 13 and older than 72 with risk factors for HIV infection  For pregnant patients, it is covered up to 3 times per pregnancy      Immunizations:  Immunization Recommendations   Influenza Vaccine Annual influenza vaccination during flu season is recommended for all persons aged >= 6 months who do not have contraindications   Pneumococcal Vaccine (Prevnar and Pneumovax)  * Prevnar = PCV13  * Pneumovax = PPSV23 Adults 25-60 years old: 1-3 doses may be recommended based on certain risk factors  Adults 72 years old: Prevnar (PCV13) vaccine recommended followed by Pneumovax (PPSV23) vaccine  If already received PPSV23 since turning 65, then PCV13 recommended at least one year after PPSV23 dose  Hepatitis B Vaccine 3 dose series if at intermediate or high risk (ex: diabetes, end stage renal disease, liver disease)   Tetanus (Td) Vaccine - COST NOT COVERED BY MEDICARE PART B Following completion of primary series, a booster dose should be given every 10 years to maintain immunity against tetanus  Td may also be given as tetanus wound prophylaxis  Tdap Vaccine - COST NOT COVERED BY MEDICARE PART B Recommended at least once for all adults  For pregnant patients, recommended with each pregnancy  Shingles Vaccine (Shingrix) - COST NOT COVERED BY MEDICARE PART B  2 shot series recommended in those aged 48 and above     Health Maintenance Due:      Topic Date Due    Colorectal Cancer Screening  04/21/2022    Hepatitis C Screening  Completed     Immunizations Due:      Topic Date Due    Pneumococcal Vaccine: 65+ Years (1 of 2 - PPSV23) 11/23/2020     Advance Directives   What are advance directives? Advance directives are legal documents that state your wishes and plans for medical care  These plans are made ahead of time in case you lose your ability to make decisions for yourself  Advance directives can apply to any medical decision, such as the treatments you want, and if you want to donate organs  What are the types of advance directives? There are many types of advance directives, and each state has rules about how to use them  You may choose a combination of any of the following:  · Living will: This is a written record of the treatment you want  You can also choose which treatments you do not want, which to limit, and which to stop at a certain time  This includes surgery, medicine, IV fluid, and tube feedings     · Durable power of  for [facility]): This is a written record that states who you want to make healthcare choices for you when you are unable to make them for yourself  This person, called a proxy, is usually a family member or a friend  You may choose more than 1 proxy  · Do not resuscitate (DNR) order:  A DNR order is used in case your heart stops beating or you stop breathing  It is a request not to have certain forms of treatment, such as CPR  A DNR order may be included in other types of advance directives  · Medical directive: This covers the care that you want if you are in a coma, near death, or unable to make decisions for yourself  You can list the treatments you want for each condition  Treatment may include pain medicine, surgery, blood transfusions, dialysis, IV or tube feedings, and a ventilator (breathing machine)  · Values history: This document has questions about your views, beliefs, and how you feel and think about life  This information can help others choose the care that you would choose  Why are advance directives important? An advance directive helps you control your care  Although spoken wishes may be used, it is better to have your wishes written down  Spoken wishes can be misunderstood, or not followed  Treatments may be given even if you do not want them  An advance directive may make it easier for your family to make difficult choices about your care  Cigarette Smoking and Your Health   Risks to your health if you smoke:  Nicotine and other chemicals found in tobacco damage every cell in your body  Even if you are a light smoker, you have an increased risk for cancer, heart disease, and lung disease  If you are pregnant or have diabetes, smoking increases your risk for complications  Benefits to your health if you stop smoking:   · You decrease respiratory symptoms such as coughing, wheezing, and shortness of breath     · You reduce your risk for cancers of the lung, mouth, throat, kidney, bladder, pancreas, stomach, and cervix  If you already have cancer, you increase the benefits of chemotherapy  You also reduce your risk for cancer returning or a second cancer from developing  · You reduce your risk for heart disease, blood clots, heart attack, and stroke  · You reduce your risk for lung infections, and diseases such as pneumonia, asthma, chronic bronchitis, and emphysema  · Your circulation improves  More oxygen can be delivered to your body  If you have diabetes, you lower your risk for complications, such as kidney, artery, and eye diseases  You also lower your risk for nerve damage  Nerve damage can lead to amputations, poor vision, and blindness  · You improve your body's ability to heal and to fight infections  For more information and support to stop smoking:   · [employer]  Phone: 1- 141 - 443-2065  Web Address: ilab  Weight Management   Why it is important to manage your weight:  Being overweight increases your risk of health conditions such as heart disease, high blood pressure, type 2 diabetes, and certain types of cancer  It can also increase your risk for osteoarthritis, sleep apnea, and other respiratory problems  Aim for a slow, steady weight loss  Even a small amount of weight loss can lower your risk of health problems  How to lose weight safely:  A safe and healthy way to lose weight is to eat fewer calories and get regular exercise  You can lose up about 1 pound a week by decreasing the number of calories you eat by 500 calories each day  Healthy meal plan for weight management:  A healthy meal plan includes a variety of foods, contains fewer calories, and helps you stay healthy  A healthy meal plan includes the following:  · Eat whole-grain foods more often  A healthy meal plan should contain fiber  Fiber is the part of grains, fruits, and vegetables that is not broken down by your body   Whole-grain foods are healthy and provide extra fiber in your diet  Some examples of whole-grain foods are whole-wheat breads and pastas, oatmeal, brown rice, and bulgur  · Eat a variety of vegetables every day  Include dark, leafy greens such as spinach, kale, meño greens, and mustard greens  Eat yellow and orange vegetables such as carrots, sweet potatoes, and winter squash  · Eat a variety of fruits every day  Choose fresh or canned fruit (canned in its own juice or light syrup) instead of juice  Fruit juice has very little or no fiber  · Eat low-fat dairy foods  Drink fat-free (skim) milk or 1% milk  Eat fat-free yogurt and low-fat cottage cheese  Try low-fat cheeses such as mozzarella and other reduced-fat cheeses  · Choose meat and other protein foods that are low in fat  Choose beans or other legumes such as split peas or lentils  Choose fish, skinless poultry (chicken or turkey), or lean cuts of red meat (beef or pork)  Before you cook meat or poultry, cut off any visible fat  · Use less fat and oil  Try baking foods instead of frying them  Add less fat, such as margarine, sour cream, regular salad dressing and mayonnaise to foods  Eat fewer high-fat foods  Some examples of high-fat foods include french fries, doughnuts, ice cream, and cakes  · Eat fewer sweets  Limit foods and drinks that are high in sugar  This includes candy, cookies, regular soda, and sweetened drinks  Exercise:  Exercise at least 30 minutes per day on most days of the week  Some examples of exercise include walking, biking, dancing, and swimming  You can also fit in more physical activity by taking the stairs instead of the elevator or parking farther away from stores  Ask your healthcare provider about the best exercise plan for you  © Copyright [employer] 2018 Information is for End User's use only and may not be sold, redistributed or otherwise used for commercial purposes   All illustrations and images included in CareNotes® are the copyrighted property of [employer]  or [address] [facility]    Continue current statin dose  Schedule colonoscopy later on this year  Consider urologic consultation  Otherwise repeat PSA with free PSA in 3 months  Trial Chantix-prescription sent  Recheck 6 weeks in to start of medication    Follow-up with eye doctor

## 2022-07-26 ENCOUNTER — HOSPITAL ENCOUNTER (OUTPATIENT)
Dept: PHYSICAL THERAPY | Age: 52
Setting detail: THERAPIES SERIES
Discharge: HOME | End: 2022-07-26
Payer: MEDICARE

## 2022-07-26 PROCEDURE — 97163 PT EVAL HIGH COMPLEX 45 MIN: CPT

## 2022-07-26 ASSESSMENT — PAIN DESCRIPTION - LOCATION: LOCATION: BACK;SHOULDER

## 2022-07-26 ASSESSMENT — PAIN SCALES - GENERAL: PAINLEVEL_OUTOF10: 8
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
## 2022-08-02 ENCOUNTER — HOSPITAL ENCOUNTER (OUTPATIENT)
Dept: PHYSICAL THERAPY | Age: 52
Setting detail: THERAPIES SERIES
Discharge: HOME | End: 2022-08-02
Payer: MEDICAID

## 2022-08-02 PROCEDURE — 97140 MANUAL THERAPY 1/> REGIONS: CPT

## 2022-08-02 PROCEDURE — 97110 THERAPEUTIC EXERCISES: CPT

## 2022-08-02 ASSESSMENT — PAIN DESCRIPTION - LOCATION: LOCATION: BACK;SHOULDER

## 2022-08-02 ASSESSMENT — PAIN SCALES - GENERAL: PAINLEVEL_OUTOF10: 9

## 2022-08-02 NOTE — PROGRESS NOTES
Phone: [address]      Fax: [phone]                            Outpatient Physical Therapy                                                                            Daily Note    Date: 2022  Patient Name: Makenna Stein        MRN: [id number]   ACCT#:  [de-identified]  : 1970  (47 y.o.)    Referring Provider (secondary): Amisha Hsu CNP         Diagnosis: Left shoulder pain and low back pain  Treatment Diagnosis: L shld pain, back pain    PT Insurance Information: [facility], [facility]  Total # of Visits Approved: 12 Per Physician Order  Total # of Visits to Date: 2  Plan of Care/Certification Expiration Date: 22    Pre-Treatment Pain:  9/10     Assessment  Assessment: Patient rates pain 9/10 in back and shoulder. Initiated ex as outlined for strength and mobility of back and shoulder. Joint mobs to shoulder and thoracic and lumbar spine. Rates pain 10/10 after session. Plan  Continue with current plan of care    Exercises/Modalities/Manual:  See DocFlow Sheet              Goals  (Total # of Visits to Date: 2)   Short Term Goals  Time Frame for Short term goals: 8  Short term goal 1: Patient to be independent with HEP  Short term goal 2: Increase PROM L shld ER 75 degrees  Short term goal 3: Increase ROM L shld IR 65 degrees for functional reach behind back    Long Term Goals  Time Frame for Long term goals : 12  Long term goal 1:  Increase AROM L shld flexion 145 degrees for functional overhead reach  Long term goal 2: Improve functional mobility with Oswestry score <25/50  Long term goal 3: Decrease L shld pain 5/10 at worst x3 days to aloow completion of daily activities    Post Treatment Pain:  10/10    Time In: 0900    Time Out : 0940        Timed Code Treatment Minutes: 40 Minutes  Total Treatment Time: 40 Minutes    Leana Shields  PTA   Date: 2022

## 2022-08-05 ENCOUNTER — HOSPITAL ENCOUNTER (OUTPATIENT)
Dept: PHYSICAL THERAPY | Age: 52
Setting detail: THERAPIES SERIES
Discharge: HOME | End: 2022-08-05
Payer: MEDICAID

## 2022-08-05 PROCEDURE — 97110 THERAPEUTIC EXERCISES: CPT

## 2022-08-05 PROCEDURE — 97140 MANUAL THERAPY 1/> REGIONS: CPT

## 2022-08-05 ASSESSMENT — PAIN SCALES - GENERAL: PAINLEVEL_OUTOF10: 10

## 2022-08-05 NOTE — PROGRESS NOTES
Phone: [address]oz      Fax: [phone]                            Outpatient Physical Therapy                                                                            Daily Note    Date: 2022  Patient Name: Emilee Li        MRN: [id number]   ACCT#:  [de-identified]  : 1970  (47 y.o.)    Referring Provider (secondary): Khoa Osorio CNP         Diagnosis: Left shoulder pain and low back pain  Treatment Diagnosis: L shld pain, back pain    PT Insurance Information: [facility], [facility]  Total # of Visits Approved: 12 Per Physician Order  Total # of Visits to Date: 3  No Show: 0  Canceled Appointment: 0  Plan of Care/Certification Expiration Date: 22    Pre-Treatment Pain:  10/10     Assessment  Assessment: Pt reports pain  10/10 since sleeping on left side last night. He notes overall good carmenza to last session. Progressed with HEP and intiated bridges. PROM ER 55 deg and 145 deg flex. Will cont . Plan  Continue with current plan of care    Exercises/Modalities/Manual:  See DocFlow Sheet      Goals  (Total # of Visits to Date: 3)   Short Term Goals  Time Frame for Short term goals: 8  Short term goal 1: Patient to be independent with HEP-met  Short term goal 2: Increase PROM L shld ER 75 degrees  Short term goal 3: Increase ROM L shld IR 65 degrees for functional reach behind back    Long Term Goals  Time Frame for Long term goals : 12  Long term goal 1:  Increase AROM L shld flexion 145 degrees for functional overhead reach  Long term goal 2: Improve functional mobility with Oswestry score <25/50  Long term goal 3: Decrease L shld pain 5/10 at worst x3 days to aloow completion of daily activities    Post Treatment Pain:  10/10    Time In: 0856    Time Out : 09        Timed Code Treatment Minutes: 40 Minutes  Total Treatment Time: 35 Minutes    Pepper Shields PTA   Date: 2022

## 2022-08-10 ENCOUNTER — APPOINTMENT (OUTPATIENT)
Dept: PHYSICAL THERAPY | Age: 52
End: 2022-08-10
Payer: MEDICAID

## 2022-08-12 ENCOUNTER — APPOINTMENT (OUTPATIENT)
Dept: PHYSICAL THERAPY | Age: 52
End: 2022-08-12
Payer: MEDICAID

## 2022-09-01 ENCOUNTER — HOSPITAL ENCOUNTER (OUTPATIENT)
Dept: PHYSICAL THERAPY | Age: 52
Setting detail: THERAPIES SERIES
Discharge: HOME | End: 2022-09-01
Payer: MEDICARE

## 2022-09-01 NOTE — PROGRESS NOTES
[facility] GENERAL [facility]  Rehab and Wellness    Date: 2022  Patient Name: Yrn Tavares        : 1970       Pt No Showed Appt  Called pt who states he will come to his next appt.       Sonny Shields PTADate: 2022

## 2022-09-06 ENCOUNTER — HOSPITAL ENCOUNTER (OUTPATIENT)
Dept: PHYSICAL THERAPY | Age: 52
Setting detail: THERAPIES SERIES
Discharge: HOME | End: 2022-09-06
Payer: MEDICARE

## 2022-09-06 PROCEDURE — 97140 MANUAL THERAPY 1/> REGIONS: CPT

## 2022-09-06 PROCEDURE — 97110 THERAPEUTIC EXERCISES: CPT

## 2022-09-06 ASSESSMENT — PAIN SCALES - GENERAL: PAINLEVEL_OUTOF10: 10

## 2022-09-06 NOTE — PROGRESS NOTES
Phone: [phone]      Fax: [phone]                            Outpatient Physical Therapy                                                                            Daily Note    Date: 2022  Patient Name: Desmond Ibanez        MRN: [id number]   ACCT#:  [de-identified]  : 1970  (47 y.o.)    Referring Provider (secondary): Jana Ware CNP         Diagnosis: Left shoulder pain and low back pain  Treatment Diagnosis: L shld pain, back pain    PT Insurance Information: [facility], [facility]  Total # of Visits Approved: 12 Per Physician Order  Total # of Visits to Date: 4  No Show: 1  Canceled Appointment: 0  Plan of Care/Certification Expiration Date: 22    Pre-Treatment Pain:  10/10     Assessment  Assessment: Pt reports he has not been to therapy d/t insurance reasons. Pain today 10/10. Pain never goes below 8/10. PROM ER 55 deg. Performed ex as outlined for strength and flexibility as well as manual therapy for shoulder, thoracic and lumbar mobility. Pain after session -10/10. Plan  Continue with current plan of care    Exercises/Modalities/Manual:  See DocFlow Sheet              Goals  (Total # of Visits to Date: 4)   Short Term Goals  Time Frame for Short term goals: 8  Short term goal 1: Patient to be independent with HEP-met  Short term goal 2: Increase PROM L shld ER 75 degrees  Short term goal 3: Increase ROM L shld IR 65 degrees for functional reach behind back    Long Term Goals  Time Frame for Long term goals : 12  Long term goal 1:  Increase AROM L shld flexion 145 degrees for functional overhead reach  Long term goal 2: Improve functional mobility with Oswestry score <25/50  Long term goal 3: Decrease L shld pain 5/10 at worst x3 days to aloow completion of daily activities    Post Treatment Pain:  -10/10    Time In: 0730    Time Out : 0813      Timed and total 43 min    Janeth Shields   PTA  Date: 2022

## 2022-09-09 ENCOUNTER — HOSPITAL ENCOUNTER (OUTPATIENT)
Dept: PHYSICAL THERAPY | Age: 52
Setting detail: THERAPIES SERIES
Discharge: HOME | End: 2022-09-09
Payer: MEDICARE

## 2022-09-09 PROCEDURE — 97110 THERAPEUTIC EXERCISES: CPT

## 2022-09-09 PROCEDURE — 97140 MANUAL THERAPY 1/> REGIONS: CPT

## 2022-09-09 ASSESSMENT — PAIN DESCRIPTION - LOCATION: LOCATION: BACK

## 2022-09-09 ASSESSMENT — PAIN SCALES - GENERAL: PAINLEVEL_OUTOF10: 10

## 2022-09-09 NOTE — PROGRESS NOTES
Phone: [phone]      Fax: [phone]                            Outpatient Physical Therapy                                                                            Daily Note    Date: 2022  Patient Name: Tania Cisneros        MRN: [id number]   ACCT#:  [de-identified]  : 1970  (47 y.o.)    Referring Provider (secondary): John Jack CNP         Diagnosis: Left shoulder pain and low back pain  Treatment Diagnosis: L shld pain, back pain    PT Insurance Information: [facility], [facility]  Total # of Visits Approved: 12 Per Physician Order  Total # of Visits to Date: 5  No Show: 1  Canceled Appointment: 0  Plan of Care/Certification Expiration Date: 22    Pre-Treatment Pain:  10/10     Assessment  Assessment: Pt repots he had to go to bed for 2 hours after last visit. Pain today back 10/10 and shoulder 8-9/10. Performed ex as outlined noting fair to poor tolerance to all ex(shoulder and back). Performed manual therapy for shoulder mobility and thoraco-lumbar mobility(gentle). Plan  Continue with current plan of care    Exercises/Modalities/Manual:  See DocFlow Sheet      Goals  (Total # of Visits to Date: 5)   Short Term Goals  Time Frame for Short term goals: 8  Short term goal 1: Patient to be independent with HEP-met  Short term goal 2: Increase PROM L shld ER 75 degrees  Short term goal 3: Increase ROM L shld IR 65 degrees for functional reach behind back    Long Term Goals  Time Frame for Long term goals : 12  Long term goal 1:  Increase AROM L shld flexion 145 degrees for functional overhead reach  Long term goal 2: Improve functional mobility with Oswestry score <25/50  Long term goal 3: Decrease L shld pain 5/10 at worst x3 days to aloow completion of daily activities    Post Treatment Pain:  10/10    Time In: 0732     Time Out : 0807        Timed Code Treatment Minutes: 40 Minutes  Total Treatment Time: 35 Minutes    Paula Shields PTA    Date: 9/9/2022

## 2022-09-14 ENCOUNTER — HOSPITAL ENCOUNTER (OUTPATIENT)
Dept: PHYSICAL THERAPY | Age: 52
Setting detail: THERAPIES SERIES
Discharge: HOME | End: 2022-09-14
Payer: MEDICARE

## 2022-09-14 PROCEDURE — 97140 MANUAL THERAPY 1/> REGIONS: CPT

## 2022-09-14 PROCEDURE — 97110 THERAPEUTIC EXERCISES: CPT

## 2022-09-14 ASSESSMENT — PAIN SCALES - GENERAL: PAINLEVEL_OUTOF10: 8

## 2022-09-14 ASSESSMENT — PAIN DESCRIPTION - LOCATION: LOCATION: SHOULDER

## 2022-09-14 NOTE — PROGRESS NOTES
Phone: Sedrick Munoz            Fax: [phone]                             Outpatient Physical Therapy                                                                      Progress Report    Date: 2022   MRN: [id number]    ACCT#: [de-identified]  Patient: Davina García  : 1970  Referring Provider (secondary): Vitor Livingston CNP/ Dr. Melody Nichole         Diagnosis: Left shoulder pain and low back pain    Treatment Diagnosis: L shld pain, back pain    PT Insurance Information: [facility], [facility]  Total # of Visits Approved: 12 Per Physician Order  Total # of Visits to Date: 6  No Show: 1  Canceled Appointment: 0    Assessment  Assessment: Pain 8/10 left shld. Patient c/o inable to reach with L arm. Completed therex and manual therapy per Doc Flow with focus on left shoulder. PROM L shld flexion 140, abd 85, ER 55 degrtees. Strength L shld flexion 3-/5, ER 3-/5, abd 2+/5. Minimal progress with PT, recommend MRI left shld due to probable rotator cuff tear. Therapy Prognosis: Fair    Plan  Continue with current plan of care    Goals  Short Term Goals  Time Frame for Short term goals: 8  Short term goal 1: Patient to be independent with HEP-met  Short term goal 2: Increase PROM L shld ER 75 degrees-Not Met  Short term goal 3: Increase ROM L shld IR 65 degrees for functional reach behind back- Not Met    Long Term Goals  Time Frame for Long term goals : 12  Long term goal 1:  Increase AROM L shld flexion 145 degrees for functional overhead reach  Long term goal 2: Improve functional mobility with Oswestry score <25/50  Long term goal 3: Decrease L shld pain 5/10 at worst x3 days to aloow completion of daily activities    Keira Vang, PT    Date: 2022

## 2022-09-14 NOTE — PROGRESS NOTES
Phone: Sedrick Munoz      Fax: [phone]                            Outpatient Physical Therapy                                                                            Daily Note    Date: 2022  Patient Name: Giovanna Hamman        MRN: [id number]   ACCT#:  [de-identified]  : 1970  (47 y.o.)    Referring Provider (secondary): Leela Kang CNP/ Dr. Samantha Foreman         Diagnosis: Left shoulder pain and low back pain  Treatment Diagnosis: L shld pain, back pain    PT Insurance Information: [facility], [facility]  Total # of Visits Approved: 12 Per Physician Order  Total # of Visits to Date: 6  No Show: 1  Canceled Appointment: 0  Plan of Care/Certification Expiration Date: 22    Pre-Treatment Pain:  8/10     Assessment  Assessment: Pain 8/10 left shld. Patient c/o inable to reach with L arm. Completed therex and manual therapy per Doc Flow with focus on left shoulder. PROM L shld flexion 140, abd 85, ER 55 degrtees. Strength L shld flexion 3-/5, ER 3-/5, abd 2+/5. Minimal progress with PT, recommend MRI left shld due to probable rotator cuff tear. Plan  Continue with current plan of care    Exercises/Modalities/Manual:  See DocFlow Sheet    Education:           Goals  (Total # of Visits to Date: 6)   Short Term Goals  Time Frame for Short term goals: 8  Short term goal 1: Patient to be independent with HEP-met  Short term goal 2: Increase PROM L shld ER 75 degrees-Not Met  Short term goal 3: Increase ROM L shld IR 65 degrees for functional reach behind back    Long Term Goals  Time Frame for Long term goals : 12  Long term goal 1:  Increase AROM L shld flexion 145 degrees for functional overhead reach  Long term goal 2: Improve functional mobility with Oswestry score <25/50  Long term goal 3: Decrease L shld pain 5/10 at worst x3 days to aloow completion of daily activities    Post Treatment Pain:  8/10    Time In: 8:00    Time Out : 8:40        Timed Code Treatment Minutes: 40 Minutes  Total Treatment Time: 2 [place] Ifrah Castro, PT     Date: 9/14/2022

## 2022-09-16 ENCOUNTER — HOSPITAL ENCOUNTER (OUTPATIENT)
Dept: PHYSICAL THERAPY | Age: 52
Setting detail: THERAPIES SERIES
Discharge: HOME | End: 2022-09-16
Payer: MEDICARE

## 2022-09-16 PROCEDURE — 97140 MANUAL THERAPY 1/> REGIONS: CPT

## 2022-09-16 PROCEDURE — 97110 THERAPEUTIC EXERCISES: CPT

## 2022-09-16 ASSESSMENT — PAIN SCALES - GENERAL: PAINLEVEL_OUTOF10: 8

## 2022-09-20 ENCOUNTER — HOSPITAL ENCOUNTER (OUTPATIENT)
Dept: PHYSICAL THERAPY | Age: 52
Setting detail: THERAPIES SERIES
Discharge: HOME | End: 2022-09-20
Payer: MEDICARE

## 2022-09-20 NOTE — PROGRESS NOTES
[facility]  [facility]    Date: 2022  Patient Name: Daria Joyce        : 1970       Pt No Showed Appt  Phoned pt who states he will return next week    Dulce Morocho PTA Date: 2022

## 2022-09-22 ENCOUNTER — APPOINTMENT (OUTPATIENT)
Dept: PHYSICAL THERAPY | Age: 52
End: 2022-09-22
Payer: MEDICARE

## 2022-09-27 ENCOUNTER — HOSPITAL ENCOUNTER (OUTPATIENT)
Dept: PHYSICAL THERAPY | Age: 52
Setting detail: THERAPIES SERIES
Discharge: HOME | End: 2022-09-27
Payer: MEDICARE

## 2022-09-27 ENCOUNTER — APPOINTMENT (OUTPATIENT)
Dept: PHYSICAL THERAPY | Age: 52
End: 2022-09-27
Payer: MEDICARE

## 2022-09-27 PROCEDURE — 97110 THERAPEUTIC EXERCISES: CPT

## 2022-09-27 PROCEDURE — 97140 MANUAL THERAPY 1/> REGIONS: CPT

## 2022-09-27 ASSESSMENT — PAIN DESCRIPTION - LOCATION: LOCATION: SHOULDER;BACK

## 2022-09-27 ASSESSMENT — PAIN SCALES - GENERAL: PAINLEVEL_OUTOF10: 9

## 2022-09-27 NOTE — PROGRESS NOTES
Phone: Sedrick Munoz      Fax: [phone]                            Outpatient Physical Therapy                                                                            Daily Note    Date: 2022  Patient Name: Sara Brooks        MRN: [id number]   ACCT#:  [de-identified]  : 1970  (47 y.o.)    Referring Provider (secondary): Shahbaz Santos CNP/ Dr. Ras Brandon         Diagnosis: Left shoulder pain and low back pain  Treatment Diagnosis: L shld pain, back pain    PT Insurance Information: [facility]  Total # of Visits Approved: 12 Per Physician Order  Total # of Visits to Date: 8  No Show: 2  Canceled Appointment: 0  Plan of Care/Certification Expiration Date: 22    Pre-Treatment Pain:  9/10     Assessment  Assessment: Pain 9/10 in left shld this morning. Patient reports woke up laying on L side with 10/10 pain in L shld. Completed therex and manual therapy per Doc Flow. PROM L shld flexion 125, ER 65, IR 45 degrees. AROM flexion 65 degrees. Patient waiting to hear if MRI got approved. Plan  Continue with current plan of care    Exercises/Modalities/Manual:  See DocFlow Sheet    Education:         Goals  (Total # of Visits to Date: 8)   Short Term Goals  Time Frame for Short term goals: 8  Short term goal 1: Patient to be independent with HEP-met  Short term goal 2: Increase PROM L shld ER 75 degrees-Not Met  Short term goal 3: Increase ROM L shld IR 65 degrees for functional reach behind back-Not Met    Long Term Goals  Time Frame for Long term goals : 12  Long term goal 1:  Increase AROM L shld flexion 145 degrees for functional overhead reach  Long term goal 2: Improve functional mobility with Oswestry score <25/50  Long term goal 3: Decrease L shld pain 5/10 at worst x3 days to aloow completion of daily activities    Post Treatment Pain:  9/10    Time In: 8:30    Time Out : 9:05        Timed Code Treatment Minutes: 35 Minutes  Total Treatment Time: Dionisio 122, PT     Date: 9/27/2022

## 2022-09-30 ENCOUNTER — HOSPITAL ENCOUNTER (OUTPATIENT)
Dept: PHYSICAL THERAPY | Age: 52
Setting detail: THERAPIES SERIES
Discharge: HOME | End: 2022-09-30
Payer: MEDICARE

## 2022-09-30 PROCEDURE — 97110 THERAPEUTIC EXERCISES: CPT

## 2022-09-30 PROCEDURE — 97140 MANUAL THERAPY 1/> REGIONS: CPT

## 2022-10-05 ENCOUNTER — HOSPITAL ENCOUNTER (OUTPATIENT)
Dept: PHYSICAL THERAPY | Age: 52
Setting detail: THERAPIES SERIES
Discharge: HOME | End: 2022-10-05
Payer: MEDICARE

## 2022-10-05 PROCEDURE — 97110 THERAPEUTIC EXERCISES: CPT

## 2022-10-05 PROCEDURE — 97140 MANUAL THERAPY 1/> REGIONS: CPT

## 2022-10-07 ENCOUNTER — HOSPITAL ENCOUNTER (OUTPATIENT)
Dept: PHYSICAL THERAPY | Age: 52
Setting detail: THERAPIES SERIES
Discharge: HOME | End: 2022-10-07
Payer: MEDICARE

## 2022-10-07 PROCEDURE — 97110 THERAPEUTIC EXERCISES: CPT

## 2022-10-07 PROCEDURE — 97140 MANUAL THERAPY 1/> REGIONS: CPT

## 2022-10-07 ASSESSMENT — PAIN SCALES - GENERAL: PAINLEVEL_OUTOF10: 8

## 2022-10-07 ASSESSMENT — PAIN DESCRIPTION - LOCATION: LOCATION: SHOULDER

## 2022-10-07 NOTE — PROGRESS NOTES
Physical Therapy  Phone: Sedrick Munoz      Fax: [phone]                            Outpatient Physical Therapy                                                                            Daily Note    Date: 10/7/2022  Patient Name: Juanita Bravo        MRN: [id number]   ACCT#:  [de-identified]  : 1970  (47 y.o.)    Referring Provider (secondary): Makenzie Parks CNP/ Dr. Mariangel Zapata         Diagnosis: Left shoulder pain and low back pain  Treatment Diagnosis: L shld pain, back pain    PT Insurance Information: [facility], [facility]  Total # of Visits Approved: 12 Per Physician Order  Total # of Visits to Date: 11  No Show: 2  Canceled Appointment: 0  Plan of Care/Certification Expiration Date: 22    Pre-Treatment Pain:  8/10     Assessment  Assessment: Patient rates pain today in shoulder and neck as 8/10. Continued with exercises as outlined above with fair tolerance. Completed Oswestry  and scores 40/50. Continue x1 visit on current script. Plan  Continue with current plan of care    Exercises/Modalities/Manual:  See DocFlow Sheet    Education:           Goals  (Total # of Visits to Date: 6)   Short Term Goals  Time Frame for Short Term Goals: 8  Short Term Goal 1: Patient to be independent with HEP-met  Short Term Goal 2: Increase PROM L shld ER 75 degrees-Not Met  Short Term Goal 3: Increase ROM L shld IR 65 degrees for functional reach behind back-Not Met    Long Term Goals  Time Frame for Long Term Goals : 12  Long Term Goal 1:  Increase AROM L shld flexion 145 degrees for functional overhead reach  Long Term Goal 2: Improve functional mobility with Oswestry score <25/50-NOT MET  Long Term Goal 3: Decrease L shld pain 5/10 at worst x3 days to aloow completion of daily activities    Post Treatment Pain:  8-9/10    Time In: 1104    Time Out : 1138        Timed Code Treatment Minutes: 35 Minutes  Total Treatment Time: 67559 Hailey Porter PTA Date: 10/7/2022

## 2022-10-12 ENCOUNTER — HOSPITAL ENCOUNTER (OUTPATIENT)
Dept: PHYSICAL THERAPY | Age: 52
Setting detail: THERAPIES SERIES
Discharge: HOME | End: 2022-10-12
Payer: MEDICARE

## 2022-10-12 PROCEDURE — 97140 MANUAL THERAPY 1/> REGIONS: CPT

## 2022-10-12 PROCEDURE — 97110 THERAPEUTIC EXERCISES: CPT

## 2022-10-12 ASSESSMENT — PAIN SCALES - GENERAL: PAINLEVEL_OUTOF10: 8

## 2022-10-12 ASSESSMENT — PAIN DESCRIPTION - LOCATION: LOCATION: SHOULDER

## 2022-10-12 NOTE — DISCHARGE SUMMARY
Phone: 031 Ronni Munoz             Fax: [phone]                            Outpatient Physical Therapy                                                                    Discharge Summary    Patient: Reha Davidson  : 1970  ACCT #: [de-identified]   Referring Provider (secondary): Raphael Hernandez CNP/ Dr. Genny Doherty      Diagnosis: Left shoulder pain and low back pain    Date Treatment Initiated: 22  Date of Last Treatment: 10/12/22    PT Visit Information  PT Insurance Information: [facility], [facility]  Total # of Visits Approved: 12  Total # of Visits to Date: 12  No Show: 2  Canceled Appointment: 0    Frequency/Duration  Days: 2 times per week  Weeks: 6 weeks    Treatment Received  Patient Education/HEP, Therapeutic Exercise, and Manual Therapy: Mobilization/Manipulation    Pain Level: 8    Assessment  Assessment: Pain 8/10 Left shld and back. Oswestry score 40/50. PROM L shld: flexion 137, IR 50, ER 70, abd 110 degrees. AROM L shld flexion 80 degrees. Strength L shld grossly 3-/5. Discharged. Reason for Discharge- no progress  Completion of Prescribed visits and Optimal Function Achieved    Comments:   Thank you for this referral      Danial Stephens, PT  Date: 10/12/2022

## 2022-10-12 NOTE — PROGRESS NOTES
Phone: Sedrick Munoz      Fax: [phone]                            Outpatient Physical Therapy                                                                            Daily Note    Date: 10/12/2022  Patient Name: Dominga Salazar        MRN: [id number]   ACCT#:  [de-identified]  : 1970  (47 y.o.)    Referring Provider (secondary): Juan Alberto Simpson CNP/ Dr. Collin Moon         Diagnosis: Left shoulder pain and low back pain  Treatment Diagnosis: L shld pain, back pain    PT Insurance Information: [facility], [facility]  Total # of Visits Approved: 12 Per Physician Order  Total # of Visits to Date: 12  No Show: 2  Canceled Appointment: 0    Pre-Treatment Pain:  810     Assessment  Assessment: Pain 8/10 Left shld and back. Patient completed therex and manual therapy per Doc Flow. PROM L shld: flexion 137, IR 50, ER 70, abd 110 degrees. AROM L shld flexion 80 degrees. Discharged. Plan  Discharge    Exercises/Modalities/Manual:  See DocFlow Sheet    Education:           Goals  (Total # of Visits to Date: 15)   Short Term Goals  Time Frame for Short Term Goals: 8  Short Term Goal 1: Patient to be independent with HEP-met  Short Term Goal 2: Increase PROM L shld ER 75 degrees-Not Met  Short Term Goal 3: Increase ROM L shld IR 65 degrees for functional reach behind back-Not Met    Long Term Goals  Time Frame for Long Term Goals : 12  Long Term Goal 1:  Increase AROM L shld flexion 145 degrees for functional overhead reach-Not Met  Long Term Goal 2: Improve functional mobility with Oswestry score <25/50-NOT MET  Long Term Goal 3: Decrease L shld pain 5/10 at worst x3 days to aloow completion of daily activities-Not Met    Post Treatment Pain:  8/10    Time In: 8:12    Time Out : 8:47        Timed Code Treatment Minutes: 35 Minutes  Total Treatment Time: 28 Minutes    Sebastian Renee PT     Date: 10/12/2022

## 2023-11-20 NOTE — PROGRESS NOTES
Phone: [address] [facility]      Fax: [phone]                            Outpatient Physical Therapy                                                                            Daily Note    Date: 10/5/2022  Patient Name: Moy Ochoa        MRN: [id number]   ACCT#:  [de-identified]  : 1970  (47 y.o.)    Referring Provider (secondary): Roseline West CNP/ Dr. Prabhu Guzman         Diagnosis: Left shoulder pain and low back pain  Treatment Diagnosis: L shld pain, back pain    PT Insurance Information: [facility], [facility]  Total # of Visits Approved: 12 Per Physician Order  Total # of Visits to Date: 10  No Show: 2  Canceled Appointment: 0  Plan of Care/Certification Expiration Date: 22    Pre-Treatment Pain:  8/10     Assessment  Assessment: Pt did a lot of painting with a roller with increased L scapular paint. AROM limited to 92 deg flexion. Performed ex as outlined to promote strength and ROM. Issued pt Oswestry to return next visit. Anticipate DC in 2 visits. Plan  Continue with current plan of care    Exercises/Modalities/Manual:  See DocFlow Sheet              Goals  (Total # of Visits to Date: 8)   Short Term Goals  Time Frame for Short Term Goals: 8  Short Term Goal 1: Patient to be independent with HEP-met  Short Term Goal 2: Increase PROM L shld ER 75 degrees-Not Met  Short Term Goal 3: Increase ROM L shld IR 65 degrees for functional reach behind back-Not Met    Long Term Goals  Time Frame for Long Term Goals : 12  Long Term Goal 1:  Increase AROM L shld flexion 145 degrees for functional overhead reach  Long Term Goal 2: Improve functional mobility with Oswestry score <25/50  Long Term Goal 3: Decrease L shld pain 5/10 at worst x3 days to aloow completion of daily activities    Post Treatment Pain:  10/10    Time In: 0195   Time Out : 0810        Timed Code Treatment Minutes: 35 Minutes  Total Treatment Time: 35 Minutes    Chayo Shields PTA   Date: 10/5/2022 Yes

## 2024-12-30 PROCEDURE — 93010 ELECTROCARDIOGRAM REPORT: CPT | Performed by: INTERNAL MEDICINE
